# Patient Record
Sex: FEMALE | Race: WHITE | NOT HISPANIC OR LATINO
[De-identification: names, ages, dates, MRNs, and addresses within clinical notes are randomized per-mention and may not be internally consistent; named-entity substitution may affect disease eponyms.]

---

## 2017-09-30 ENCOUNTER — TRANSCRIPTION ENCOUNTER (OUTPATIENT)
Age: 22
End: 2017-09-30

## 2019-05-04 ENCOUNTER — APPOINTMENT (OUTPATIENT)
Dept: OBGYN | Facility: CLINIC | Age: 24
End: 2019-05-04
Payer: COMMERCIAL

## 2019-05-04 VITALS
SYSTOLIC BLOOD PRESSURE: 90 MMHG | HEIGHT: 66 IN | WEIGHT: 140 LBS | DIASTOLIC BLOOD PRESSURE: 70 MMHG | BODY MASS INDEX: 22.5 KG/M2

## 2019-05-04 DIAGNOSIS — Z78.9 OTHER SPECIFIED HEALTH STATUS: ICD-10-CM

## 2019-05-04 DIAGNOSIS — Z92.29 PERSONAL HISTORY OF OTHER DRUG THERAPY: ICD-10-CM

## 2019-05-04 PROCEDURE — 99395 PREV VISIT EST AGE 18-39: CPT

## 2019-05-04 NOTE — PHYSICAL EXAM
[Awake] : awake [Alert] : alert [Soft] : soft [Oriented x3] : oriented to person, place, and time [No Bleeding] : there was no active vaginal bleeding [Uterine Adnexae] : were not tender and not enlarged [Acute Distress] : no acute distress [Mass] : no breast mass [Nipple Discharge] : no nipple discharge [Axillary LAD] : no axillary lymphadenopathy [Tender] : non tender [Labia Majora] : labia major [Labia Minora] : labia minora [Normal] : clitoris [Pap Obtained] : a Pap smear was performed

## 2019-05-04 NOTE — HISTORY OF PRESENT ILLNESS
[Good] : being in good health [1 Year Ago] : 1 year ago [Healthy Diet] : a healthy diet [Last Pap ___] : Last cervical pap smear was [unfilled] [Reproductive Age] : is of reproductive age [Oral Contraceptive] : uses oral contraception pills [Contraception] : uses contraception [Male ___] : [unfilled] male [Monogamous] : is monogamous [Sexually Active] : is sexually active

## 2019-05-06 LAB
C TRACH RRNA SPEC QL NAA+PROBE: NOT DETECTED
N GONORRHOEA RRNA SPEC QL NAA+PROBE: NOT DETECTED
SOURCE TP AMPLIFICATION: NORMAL

## 2019-05-07 LAB — HPV HIGH+LOW RISK DNA PNL CVX: NOT DETECTED

## 2019-05-09 LAB — CYTOLOGY CVX/VAG DOC THIN PREP: NORMAL

## 2019-10-23 ENCOUNTER — APPOINTMENT (OUTPATIENT)
Dept: OBGYN | Facility: CLINIC | Age: 24
End: 2019-10-23
Payer: COMMERCIAL

## 2019-10-23 VITALS
DIASTOLIC BLOOD PRESSURE: 80 MMHG | BODY MASS INDEX: 24.72 KG/M2 | SYSTOLIC BLOOD PRESSURE: 110 MMHG | HEIGHT: 65 IN | WEIGHT: 148.37 LBS

## 2019-10-23 PROCEDURE — 99213 OFFICE O/P EST LOW 20 MIN: CPT

## 2019-10-23 NOTE — HISTORY OF PRESENT ILLNESS
[6 Months Ago] : 6 months ago [Good] : being in good health [Healthy Diet] : a healthy diet [Regular Exercise] : regular exercise [Last Pap ___] : Last cervical pap smear was [unfilled] [Reproductive Age] : is of reproductive age [Contraception] : uses contraception [Sexually Active] : is sexually active [Yes] : yes [Menstrual Problems] : reports normal menses

## 2020-01-20 ENCOUNTER — TRANSCRIPTION ENCOUNTER (OUTPATIENT)
Age: 25
End: 2020-01-20

## 2020-01-24 ENCOUNTER — OUTPATIENT (OUTPATIENT)
Dept: OUTPATIENT SERVICES | Facility: HOSPITAL | Age: 25
LOS: 1 days | End: 2020-01-24
Payer: COMMERCIAL

## 2020-01-24 ENCOUNTER — APPOINTMENT (OUTPATIENT)
Dept: MRI IMAGING | Facility: CLINIC | Age: 25
End: 2020-01-24
Payer: COMMERCIAL

## 2020-01-24 ENCOUNTER — APPOINTMENT (OUTPATIENT)
Dept: ORTHOPEDIC SURGERY | Facility: CLINIC | Age: 25
End: 2020-01-24
Payer: COMMERCIAL

## 2020-01-24 VITALS
SYSTOLIC BLOOD PRESSURE: 117 MMHG | WEIGHT: 140 LBS | HEART RATE: 64 BPM | HEIGHT: 66 IN | BODY MASS INDEX: 22.5 KG/M2 | DIASTOLIC BLOOD PRESSURE: 77 MMHG

## 2020-01-24 DIAGNOSIS — S89.92XA UNSPECIFIED INJURY OF LEFT LOWER LEG, INITIAL ENCOUNTER: ICD-10-CM

## 2020-01-24 PROCEDURE — 99203 OFFICE O/P NEW LOW 30 MIN: CPT

## 2020-01-24 PROCEDURE — 73721 MRI JNT OF LWR EXTRE W/O DYE: CPT

## 2020-01-24 PROCEDURE — 73721 MRI JNT OF LWR EXTRE W/O DYE: CPT | Mod: 26,LT

## 2020-01-24 NOTE — HISTORY OF PRESENT ILLNESS
[de-identified] : 24 y.o F presents to the office 1 week after injuring her L knee. She states she was skiing this past weekend of 1/18 and injured her L knee. Complains mostly of anterior-medial knee pain. She is unsure if she felt a pop/snap at the time of injury, describes an instability event, but was unable to continue skiing. She states her pain has decreased but is describing instability since the day of injury while ambulating. Denies previous history of injury. Pain increases with terminal knee flexion. Denies mechanical symptoms.\par \par The patient's past medical history, past surgical history, medications, allergies, and social history were reviewed by me today with the patient and documented accordingly. In addition, the patient's family history, which is noncontributory to this visit, was also reviewed.\par

## 2020-01-24 NOTE — PHYSICAL EXAM
[de-identified] : General Exam\par \par Well developed, well nourished\par No apparent distress\par Oriented to person, place, and time\par Mood: Normal\par Affect: Normal\par Balance and coordination: Normal\par Gait: Normal\par \par left knee exam\par \par Skin: Clean, dry, intact\par Inspection: No obvious malalignment, no masses, no swelling, + effusion.\par Tenderness: no MJLT. No LJLT. No tenderness over the medial and lateral patella facets. No ttp medial/lateral epicondyle, patella tendon, tibial tubercle, pes anserinus, or proximal fibula.\par ROM: 0 to 130° no pain with deep flexion in both knees\par Stability: Stable to varus, valgus, lachman testing. Negative anterior/posterior drawer.\par Additional tests: Negative McMurrays test, Negative patellar grind test. \par Strength: 5/5 Q/H/TA/GS/EHL, no atrophy\par Neuro: In tact to light touch throughout in dp/sp/tib/camille/saph nerve districutions, DTR's normal\par Pulses: 2+ DP/PT pulses.\par  [de-identified] : X-rays obtained outside were reviewed. There is a mild effusion. There is no fracture.\par \par

## 2020-01-24 NOTE — DISCUSSION/SUMMARY
[de-identified] : 24-year-old female left knee pain after twisting injury while skiing. She has no effusion. He is unable to bear weight initially. We'll obtain an MRI to further evaluate she will follow up after MRI. All questions answered

## 2020-02-03 ENCOUNTER — FORM ENCOUNTER (OUTPATIENT)
Age: 25
End: 2020-02-03

## 2020-02-04 ENCOUNTER — APPOINTMENT (OUTPATIENT)
Dept: ULTRASOUND IMAGING | Facility: CLINIC | Age: 25
End: 2020-02-04
Payer: COMMERCIAL

## 2020-02-04 ENCOUNTER — APPOINTMENT (OUTPATIENT)
Dept: ORTHOPEDIC SURGERY | Facility: CLINIC | Age: 25
End: 2020-02-04
Payer: COMMERCIAL

## 2020-02-04 ENCOUNTER — OUTPATIENT (OUTPATIENT)
Dept: OUTPATIENT SERVICES | Facility: HOSPITAL | Age: 25
LOS: 1 days | End: 2020-02-04
Payer: COMMERCIAL

## 2020-02-04 DIAGNOSIS — Z00.8 ENCOUNTER FOR OTHER GENERAL EXAMINATION: ICD-10-CM

## 2020-02-04 PROCEDURE — 99213 OFFICE O/P EST LOW 20 MIN: CPT

## 2020-02-04 PROCEDURE — 93970 EXTREMITY STUDY: CPT | Mod: 26

## 2020-02-04 PROCEDURE — 93970 EXTREMITY STUDY: CPT

## 2020-02-04 NOTE — DISCUSSION/SUMMARY
[de-identified] : 24-year-old female left knee anterior cruciate ligament tear. We discussed treatment options at length both surgical and nonsurgical. We discussed nonsurgical treatment with activity modification bracing therapy. We discussed risk of future injury. We discussed surgical treatment. Left knee arthroscopy anterior cruciate ligament reconstruction and possible lateral meniscus repair. We discussed the surgery postoperative protocol restrictions at length. Given her age and activity level I recommend hamstring autograft. Risks benefits alternatives of surgery discussed included but not limited to risks such as bleeding infection nerve and vessel injury continued pain stiffness need for future surgery medical complications such as DVT or PE and anesthesia complications. All questions are answered she will schedule her convenience\par \par c/o mild calf pain.  will obtain duplex to r/o dvt

## 2020-02-04 NOTE — PHYSICAL EXAM
[de-identified] : left knee exam\par \par Skin: Clean, dry, intact\par Inspection: No obvious malalignment, no masses, no swelling, + effusion.\par Tenderness: no MJLT. No LJLT. No tenderness over the medial and lateral patella facets. No ttp medial/lateral epicondyle, patella tendon, tibial tubercle, pes anserinus, or proximal fibula.\par ROM: 0 to 130° no pain with deep flexion in both knees\par Stability: Stable to varus, valgus, lachman testing. Negative anterior/posterior drawer.\par Additional tests: Negative McMurrays test, Negative patellar grind test. \par Strength: 5/5 Q/H/TA/GS/EHL, no atrophy\par Neuro: In tact to light touch throughout in dp/sp/tib/camille/saph nerve districutions, DTR's normal\par Pulses: 2+ DP/PT pulses. [de-identified] : MRI left knee reviewed. It is full-thickness tearing of the anterior cruciate ligament with bone contusions. Possible small vertical lateral meniscus tear\par \par

## 2020-02-04 NOTE — HISTORY OF PRESENT ILLNESS
[de-identified] : 24 y.o F presents to the office 1 week after injuring her L knee. She states she was skiing this past weekend of 1/18 and injured her L knee. Complains mostly of anterior-medial knee pain. She is unsure if she felt a pop/snap at the time of injury, describes an instability event, but was unable to continue skiing. She states her pain has decreased but is describing instability since the day of injury while ambulating. Denies previous history of injury. Pain increases with terminal knee flexion. Denies mechanical symptoms.\par A MRI was done since last visit, here to review results today.

## 2020-02-05 ENCOUNTER — NON-APPOINTMENT (OUTPATIENT)
Age: 25
End: 2020-02-05

## 2020-02-08 ENCOUNTER — NON-APPOINTMENT (OUTPATIENT)
Age: 25
End: 2020-02-08

## 2020-02-28 ENCOUNTER — OUTPATIENT (OUTPATIENT)
Dept: OUTPATIENT SERVICES | Facility: HOSPITAL | Age: 25
LOS: 1 days | Discharge: HOME | End: 2020-02-28

## 2020-02-28 ENCOUNTER — TRANSCRIPTION ENCOUNTER (OUTPATIENT)
Age: 25
End: 2020-02-28

## 2020-02-28 DIAGNOSIS — Z01.818 ENCOUNTER FOR OTHER PREPROCEDURAL EXAMINATION: ICD-10-CM

## 2020-02-28 DIAGNOSIS — S83.512A SPRAIN OF ANTERIOR CRUCIATE LIGAMENT OF LEFT KNEE, INITIAL ENCOUNTER: ICD-10-CM

## 2020-02-28 LAB
ANION GAP SERPL CALC-SCNC: 4 MMOL/L — LOW (ref 5–17)
BUN SERPL-MCNC: 11 MG/DL — SIGNIFICANT CHANGE UP (ref 7–23)
CALCIUM SERPL-MCNC: 9.6 MG/DL — SIGNIFICANT CHANGE UP (ref 8.5–10.1)
CHLORIDE SERPL-SCNC: 105 MMOL/L — SIGNIFICANT CHANGE UP (ref 96–108)
CO2 SERPL-SCNC: 29 MMOL/L — SIGNIFICANT CHANGE UP (ref 22–31)
CREAT SERPL-MCNC: 0.7 MG/DL — SIGNIFICANT CHANGE UP (ref 0.5–1.3)
GLUCOSE SERPL-MCNC: 74 MG/DL — SIGNIFICANT CHANGE UP (ref 70–99)
HCG UR QL: NEGATIVE — SIGNIFICANT CHANGE UP
HCT VFR BLD CALC: 40.4 % — SIGNIFICANT CHANGE UP (ref 34.5–45)
HGB BLD-MCNC: 13.8 G/DL — SIGNIFICANT CHANGE UP (ref 11.5–15.5)
MCHC RBC-ENTMCNC: 31.2 PG — SIGNIFICANT CHANGE UP (ref 27–34)
MCHC RBC-ENTMCNC: 34.2 GM/DL — SIGNIFICANT CHANGE UP (ref 32–36)
MCV RBC AUTO: 91.4 FL — SIGNIFICANT CHANGE UP (ref 80–100)
NRBC # BLD: 0 /100 WBCS — SIGNIFICANT CHANGE UP (ref 0–0)
PLATELET # BLD AUTO: 234 K/UL — SIGNIFICANT CHANGE UP (ref 150–400)
POTASSIUM SERPL-MCNC: 4 MMOL/L — SIGNIFICANT CHANGE UP (ref 3.5–5.3)
POTASSIUM SERPL-SCNC: 4 MMOL/L — SIGNIFICANT CHANGE UP (ref 3.5–5.3)
RBC # BLD: 4.42 M/UL — SIGNIFICANT CHANGE UP (ref 3.8–5.2)
RBC # FLD: 11.9 % — SIGNIFICANT CHANGE UP (ref 10.3–14.5)
SODIUM SERPL-SCNC: 138 MMOL/L — SIGNIFICANT CHANGE UP (ref 135–145)
WBC # BLD: 6.11 K/UL — SIGNIFICANT CHANGE UP (ref 3.8–10.5)
WBC # FLD AUTO: 6.11 K/UL — SIGNIFICANT CHANGE UP (ref 3.8–10.5)

## 2020-02-28 NOTE — H&P PST ADULT - NSICDXPROBLEM_GEN_ALL_CORE_FT
Improved
PROBLEM DIAGNOSES  Problem: Sprain of anterior cruciate ligament of left knee, initial encounter  Assessment and Plan: Left knee arthroscopy, ACL reconstruction on 3/12/2020

## 2020-02-28 NOTE — H&P PST ADULT - HISTORY OF PRESENT ILLNESS
24 year old female with no significant past medical history injured left knee while skining in 1/2020.  She is scheduled for a left knee arthrosocpy ACL reconstruction on on 3/12/2020. 24 year old female with no significant past medical history injured her left knee while skiing in 1/2020.  She is scheduled for a left knee arthroscopy, ACL reconstruction on 3/12/2020.

## 2020-02-28 NOTE — H&P PST ADULT - ASSESSMENT
24 year old female with no significant past medical history injured her left knee while skiing in 2020.  She is scheduled for a left knee arthroscopy, ACL reconstruction on 3/12/2020.    CAPRINI SCORE [CLOT]    AGE RELATED RISK FACTORS                                                       MOBILITY RELATED FACTORS  [ ] Age 41-60 years                                            (1 Point)                  [ ] Bed rest                                                        (1 Point)  [ ] Age: 61-74 years                                           (2 Points)                 [ ] Plaster cast                                                   (2 Points)  [ ] Age= 75 years                                              (3 Points)                 [ ] Bed bound for more than 72 hours                 (2 Points)    DISEASE RELATED RISK FACTORS                                               GENDER SPECIFIC FACTORS  [ ] Edema in the lower extremities                       (1 Point)                  [ ] Pregnancy                                                     (1 Point)  [ ] Varicose veins                                               (1 Point)                  [ ] Post-partum < 6 weeks                                   (1 Point)             [ ] BMI > 25 Kg/m2                                            (1 Point)                  [ ] Hormonal therapy  or oral contraception          (1 Point)                 [ ] Sepsis (in the previous month)                        (1 Point)                  [ ] History of pregnancy complications                 (1 point)  [ ] Pneumonia or serious lung disease                                               [ ] Unexplained or recurrent                     (1 Point)           (in the previous month)                               (1 Point)  [ ] Abnormal pulmonary function test                     (1 Point)                 SURGERY RELATED RISK FACTORS  [ ] Acute myocardial infarction                              (1 Point)                 [ ]  Section                                             (1 Point)  [ ] Congestive heart failure (in the previous month)  (1 Point)               [ ] Minor surgery                                                  (1 Point)   [ ] Inflammatory bowel disease                             (1 Point)                 [x ] Arthroscopic surgery                                        (2 Points)  [ ] Central venous access                                      (2 Points)                [ ] General surgery lasting more than 45 minutes   (2 Points)       [ ] Stroke (in the previous month)                          (5 Points)               [ ] Elective arthroplasty                                         (5 Points)                                                                                                                                               HEMATOLOGY RELATED FACTORS                                                 TRAUMA RELATED RISK FACTORS  [ ] Prior episodes of VTE                                     (3 Points)                [ ] Fracture of the hip, pelvis, or leg                       (5 Points)  [ ] Positive family history for VTE                         (3 Points)                 [ ] Acute spinal cord injury (in the previous month)  (5 Points)  [ ] Prothrombin 61249 A                                     (3 Points)                 [ ] Paralysis  (less than 1 month)                             (5 Points)  [ ] Factor V Leiden                                             (3 Points)                  [ ] Multiple Trauma within 1 month                        (5 Points)  [ ] Lupus anticoagulants                                     (3 Points)                                                           [ ] Anticardiolipin antibodies                               (3 Points)                                                       [ ] High homocysteine in the blood                      (3 Points)                                             [ ] Other congenital or acquired thrombophilia      (3 Points)                                                [ ] Heparin induced thrombocytopenia                  (3 Points)                                          Total Score [  2        ]    Caprini Score 0 - 2:  Low Risk, No VTE Prophylaxis required for most patients, encourage ambulation  Caprini Score 3 - 6:  At Risk, pharmacologic VTE prophylaxis is indicated for most patients (in the absence of a contraindication)  Caprini Score Greater than or = 7:  High Risk, pharmacologic VTE prophylaxis is indicated for most patients (in the absence of a contraindication)

## 2020-03-11 ENCOUNTER — TRANSCRIPTION ENCOUNTER (OUTPATIENT)
Age: 25
End: 2020-03-11

## 2020-03-12 ENCOUNTER — OUTPATIENT (OUTPATIENT)
Dept: OUTPATIENT SERVICES | Facility: HOSPITAL | Age: 25
LOS: 1 days | Discharge: HOME | End: 2020-03-12
Payer: MEDICARE

## 2020-03-12 ENCOUNTER — APPOINTMENT (OUTPATIENT)
Dept: ORTHOPEDIC SURGERY | Facility: HOSPITAL | Age: 25
End: 2020-03-12

## 2020-03-12 VITALS
HEART RATE: 91 BPM | RESPIRATION RATE: 16 BRPM | TEMPERATURE: 98 F | SYSTOLIC BLOOD PRESSURE: 136 MMHG | OXYGEN SATURATION: 97 % | DIASTOLIC BLOOD PRESSURE: 80 MMHG

## 2020-03-12 VITALS
WEIGHT: 139.99 LBS | DIASTOLIC BLOOD PRESSURE: 84 MMHG | HEART RATE: 58 BPM | TEMPERATURE: 98 F | SYSTOLIC BLOOD PRESSURE: 121 MMHG | OXYGEN SATURATION: 98 % | RESPIRATION RATE: 20 BRPM | HEIGHT: 66 IN

## 2020-03-12 LAB — HCG UR QL: NEGATIVE — SIGNIFICANT CHANGE UP

## 2020-03-12 PROCEDURE — 29888 ARTHRS AID ACL RPR/AGMNTJ: CPT | Mod: LT

## 2020-03-12 RX ORDER — SODIUM CHLORIDE 9 MG/ML
3 INJECTION INTRAMUSCULAR; INTRAVENOUS; SUBCUTANEOUS EVERY 8 HOURS
Refills: 0 | Status: DISCONTINUED | OUTPATIENT
Start: 2020-03-12 | End: 2020-03-12

## 2020-03-12 RX ORDER — NORGESTIMATE AND ETHINYL ESTRADIOL 7DAYSX3 LO
1 KIT ORAL
Qty: 0 | Refills: 0 | DISCHARGE

## 2020-03-12 RX ORDER — SODIUM CHLORIDE 9 MG/ML
1000 INJECTION, SOLUTION INTRAVENOUS
Refills: 0 | Status: DISCONTINUED | OUTPATIENT
Start: 2020-03-12 | End: 2020-03-12

## 2020-03-12 RX ORDER — ACETAMINOPHEN 500 MG
1000 TABLET ORAL ONCE
Refills: 0 | Status: COMPLETED | OUTPATIENT
Start: 2020-03-12 | End: 2020-03-12

## 2020-03-12 RX ORDER — HYDROMORPHONE HYDROCHLORIDE 2 MG/ML
0.25 INJECTION INTRAMUSCULAR; INTRAVENOUS; SUBCUTANEOUS
Refills: 0 | Status: DISCONTINUED | OUTPATIENT
Start: 2020-03-12 | End: 2020-03-12

## 2020-03-12 RX ORDER — ONDANSETRON 8 MG/1
4 TABLET, FILM COATED ORAL ONCE
Refills: 0 | Status: DISCONTINUED | OUTPATIENT
Start: 2020-03-12 | End: 2020-03-12

## 2020-03-12 RX ORDER — HYDROMORPHONE HYDROCHLORIDE 2 MG/ML
0.5 INJECTION INTRAMUSCULAR; INTRAVENOUS; SUBCUTANEOUS
Refills: 0 | Status: DISCONTINUED | OUTPATIENT
Start: 2020-03-12 | End: 2020-03-12

## 2020-03-12 RX ORDER — ASPIRIN/CALCIUM CARB/MAGNESIUM 324 MG
1 TABLET ORAL
Qty: 30 | Refills: 0
Start: 2020-03-12 | End: 2020-04-10

## 2020-03-12 RX ADMIN — Medication 400 MILLIGRAM(S): at 14:38

## 2020-03-12 RX ADMIN — Medication 1000 MILLIGRAM(S): at 15:00

## 2020-03-12 RX ADMIN — HYDROMORPHONE HYDROCHLORIDE 0.5 MILLIGRAM(S): 2 INJECTION INTRAMUSCULAR; INTRAVENOUS; SUBCUTANEOUS at 15:00

## 2020-03-12 RX ADMIN — HYDROMORPHONE HYDROCHLORIDE 0.5 MILLIGRAM(S): 2 INJECTION INTRAMUSCULAR; INTRAVENOUS; SUBCUTANEOUS at 14:39

## 2020-03-12 RX ADMIN — SODIUM CHLORIDE 100 MILLILITER(S): 9 INJECTION, SOLUTION INTRAVENOUS at 14:40

## 2020-03-12 NOTE — ASU PATIENT PROFILE, ADULT - AS SC BRADEN FRICTION
Spoke with pt to schedule fasting labs. Pt will get labs done on 10/7/2017. Pt voiced understanding.    (3) no apparent problem

## 2020-03-12 NOTE — ASU DISCHARGE PLAN (ADULT/PEDIATRIC) - CALL YOUR DOCTOR IF YOU HAVE ANY OF THE FOLLOWING:
Fever greater than (need to indicate Fahrenheit or Celsius)/Numbness, tingling, color or temperature change to extremity/Inability to tolerate liquids or foods/Bleeding that does not stop/Swelling that gets worse/Wound/Surgical Site with redness, or foul smelling discharge or pus Fever greater than (need to indicate Fahrenheit or Celsius)/Numbness, tingling, color or temperature change to extremity/Bleeding that does not stop/Inability to tolerate liquids or foods/Swelling that gets worse/Wound/Surgical Site with redness, or foul smelling discharge or pus/Pain not relieved by Medications

## 2020-03-12 NOTE — BRIEF OPERATIVE NOTE - NSICDXBRIEFPOSTOP_GEN_ALL_CORE_FT
DTs (delirium tremens)    EtOH dependence    Gastritis    Mixed hyperlipidemia    PUD (peptic ulcer disease)    Substance abuse
POST-OP DIAGNOSIS:  ACL tear 12-Mar-2020 13:19:25  Deric Baires

## 2020-03-12 NOTE — ASU DISCHARGE PLAN (ADULT/PEDIATRIC) - CARE PROVIDER_API CALL
Ad Elmore)  Orthopaedic Surgery  [address], [address]  [place] [address]  Phone: [phone]  Fax: [phone]  Follow Up Time: 2 weeks

## 2020-03-16 DIAGNOSIS — S83.512A SPRAIN OF ANTERIOR CRUCIATE LIGAMENT OF LEFT KNEE, INITIAL ENCOUNTER: ICD-10-CM
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
## 2020-03-24 ENCOUNTER — APPOINTMENT (OUTPATIENT)
Dept: ORTHOPEDIC SURGERY | Facility: CLINIC | Age: 25
End: 2020-03-24
Payer: COMMERCIAL

## 2020-03-24 PROBLEM — Z78.9 OTHER SPECIFIED HEALTH STATUS: Chronic | Status: ACTIVE | Noted: 2020-02-28

## 2020-03-24 PROCEDURE — 99024 POSTOP FOLLOW-UP VISIT: CPT

## 2020-03-24 NOTE — HISTORY OF PRESENT ILLNESS
[de-identified] : L knee [de-identified] : 24yo F s/p Left knee arthroscopy, anterior cruciate ligament reconstruction with hamstring autograft, 3/12/20.  She is doing well, no longer taking pain medication, pain improving, started PT, compliant with brace and crutches.  Denies numbness/tingling, fevers/chills. [de-identified] : Left knee exam\par Incisions are healing well no erythema\par Mild effusion\par Range of motion 0-°80\par no Medial joint line tenderness\par 1+ ant drawer 1 a lachman\par calf is soft and nontender\par Able to flex and extend all toes\par Sensation intact throughout\par brisk capillary refill.\par  [de-identified] : 24yo F s/p Left knee arthroscopy, anterior cruciate ligament reconstruction with hamstring autograft, 3/12/20. [de-identified] : She is doing well at this time. We reviewed postoperative protocol and restrictions. We reviewed the intraoperative photographs. Brace unlocked. Partial weightbearing with crutches. Encouraged to therapy home exercises. He will followup one month

## 2020-04-21 ENCOUNTER — APPOINTMENT (OUTPATIENT)
Dept: ORTHOPEDIC SURGERY | Facility: CLINIC | Age: 25
End: 2020-04-21
Payer: COMMERCIAL

## 2020-04-21 PROCEDURE — 99024 POSTOP FOLLOW-UP VISIT: CPT

## 2020-04-21 NOTE — DISCUSSION/SUMMARY
[de-identified] : We reviewed postoperative protocol and prescriptions. She may discontinue her brace at this time. Weightbearing as tolerated. Physical therapy to begin to focus on quadriceps strengthening as well as range of motion. She will follow up in 6 weeks. She is given a short knee sleeve today to wear while all questions answered

## 2020-04-21 NOTE — HISTORY OF PRESENT ILLNESS
[de-identified] : 25 y.o F presents to the office 6 weeks s/p ACL reconstruction w/ hamstring graft. She is doing well overall. Pain under control but reports intermittent soreness. Working at [employer] physical therapy 2-3 times per week.  she is walking in brace without crutches. denies n/t

## 2020-04-21 NOTE — PHYSICAL EXAM
[de-identified] : R knee exam\par inc well healed\par rom 1-120\par stable ant drawer and lachman\par calf soft nontender\par nvid\par

## 2020-04-26 ENCOUNTER — MESSAGE (OUTPATIENT)
Age: 25
End: 2020-04-26

## 2020-06-08 ENCOUNTER — APPOINTMENT (OUTPATIENT)
Dept: OBGYN | Facility: CLINIC | Age: 25
End: 2020-06-08

## 2020-06-10 ENCOUNTER — APPOINTMENT (OUTPATIENT)
Dept: ORTHOPEDIC SURGERY | Facility: CLINIC | Age: 25
End: 2020-06-10
Payer: COMMERCIAL

## 2020-06-10 DIAGNOSIS — S83.512A SPRAIN OF ANTERIOR CRUCIATE LIGAMENT OF LEFT KNEE, INITIAL ENCOUNTER: ICD-10-CM

## 2020-06-10 PROCEDURE — 99024 POSTOP FOLLOW-UP VISIT: CPT

## 2020-06-10 NOTE — HISTORY OF PRESENT ILLNESS
[de-identified] : L knee [de-identified] : Left knee exam\par Incisions are healing well no erythema\par no effusion\par Range of motion 0-°140\par no Medial joint line tenderness\par stable ant drawer and lachman\par calf is soft and nontender\par Able to flex and extend all toes\par Sensation intact throughout\par brisk capillary refill.\par  [de-identified] : 26yo F s/p Left knee arthroscopy, anterior cruciate ligament reconstruction with hamstring autograft, 3/12/20.  She is doing well, no longer taking pain medication, pain improving, working w PT.  Denies numbness/tingling, fevers/chills. [de-identified] : We reviewed postoperative protocol and restrictions. Continue physical therapy for some quadriceps strengthening and begin straight-line jogging. She will followup in 3 months [de-identified] : 26yo F s/p Left knee arthroscopy, anterior cruciate ligament reconstruction with hamstring autograft, 3/12/20.

## 2020-06-17 ENCOUNTER — TRANSCRIPTION ENCOUNTER (OUTPATIENT)
Age: 25
End: 2020-06-17

## 2020-07-07 ENCOUNTER — APPOINTMENT (OUTPATIENT)
Dept: OBGYN | Facility: CLINIC | Age: 25
End: 2020-07-07
Payer: COMMERCIAL

## 2020-07-07 VITALS
BODY MASS INDEX: 22.5 KG/M2 | SYSTOLIC BLOOD PRESSURE: 110 MMHG | TEMPERATURE: 98.5 F | HEIGHT: 66 IN | WEIGHT: 140 LBS | DIASTOLIC BLOOD PRESSURE: 70 MMHG

## 2020-07-07 PROCEDURE — 99395 PREV VISIT EST AGE 18-39: CPT

## 2020-07-07 RX ORDER — ASPIRIN 325 MG/1
325 TABLET, FILM COATED ORAL TWICE DAILY
Qty: 60 | Refills: 0 | Status: DISCONTINUED | COMMUNITY
Start: 2020-03-13 | End: 2020-07-07

## 2020-07-07 RX ORDER — OXYCODONE AND ACETAMINOPHEN 5; 325 MG/1; MG/1
5-325 TABLET ORAL
Qty: 30 | Refills: 0 | Status: DISCONTINUED | COMMUNITY
Start: 2020-03-12 | End: 2020-07-07

## 2020-07-07 NOTE — PHYSICAL EXAM
[Awake] : awake [Alert] : alert [Acute Distress] : no acute distress [Mass] : no breast mass [Nipple Discharge] : no nipple discharge [Axillary LAD] : no axillary lymphadenopathy [Soft] : soft [Tender] : non tender [Oriented x3] : oriented to person, place, and time [Normal] : uterus [No Bleeding] : there was no active vaginal bleeding [Pap Obtained] : a Pap smear was performed [Retroversion] : retroverted [Tenderness] : nontender [Enlarged ___ wks] : not enlarged [Mass ___ cm] : no uterine mass was palpated [Uterine Adnexae] : were not tender and not enlarged [Ovarian Mass (___ Cm)] : there were no adnexal masses [Adnexa Tenderness] : were not tender

## 2020-07-07 NOTE — CHIEF COMPLAINT
[Annual Visit] : annual visit [FreeTextEntry1] : Pt without complaints. Had repair of ACl in March. 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

## 2020-07-07 NOTE — HISTORY OF PRESENT ILLNESS
[6 Months Ago] : 6 months ago [Regular Exercise] : regular exercise [Healthy Diet] : a healthy diet [Good] : being in good health [Last Pap ___] : Last cervical pap smear was [unfilled] [Reproductive Age] : is of reproductive age [Contraception] : uses contraception [Sexually Active] : is sexually active [Menstrual Problems] : reports normal menses

## 2021-03-03 ENCOUNTER — TRANSCRIPTION ENCOUNTER (OUTPATIENT)
Age: 26
End: 2021-03-03

## 2021-03-16 ENCOUNTER — APPOINTMENT (OUTPATIENT)
Dept: OBGYN | Facility: CLINIC | Age: 26
End: 2021-03-16
Payer: COMMERCIAL

## 2021-03-16 VITALS
DIASTOLIC BLOOD PRESSURE: 70 MMHG | SYSTOLIC BLOOD PRESSURE: 104 MMHG | BODY MASS INDEX: 23.78 KG/M2 | HEIGHT: 66 IN | WEIGHT: 148 LBS | TEMPERATURE: 97 F | RESPIRATION RATE: 16 BRPM

## 2021-03-16 PROCEDURE — 99213 OFFICE O/P EST LOW 20 MIN: CPT

## 2021-03-16 PROCEDURE — 99072 ADDL SUPL MATRL&STAF TM PHE: CPT

## 2021-03-16 NOTE — HISTORY OF PRESENT ILLNESS
[No] : Patient does not have concerns regarding sex [Currently Active] : currently active [Men] : men [Vaginal] : vaginal [FreeTextEntry1] : Pt here for ocp refill on sprintec. No complaints.

## 2021-06-04 ENCOUNTER — TRANSCRIPTION ENCOUNTER (OUTPATIENT)
Age: 26
End: 2021-06-04

## 2021-06-15 ENCOUNTER — TRANSCRIPTION ENCOUNTER (OUTPATIENT)
Age: 26
End: 2021-06-15

## 2021-06-24 ENCOUNTER — TRANSCRIPTION ENCOUNTER (OUTPATIENT)
Age: 26
End: 2021-06-24

## 2021-08-16 ENCOUNTER — APPOINTMENT (OUTPATIENT)
Dept: OBGYN | Facility: CLINIC | Age: 26
End: 2021-08-16

## 2021-08-26 ENCOUNTER — APPOINTMENT (OUTPATIENT)
Dept: OBGYN | Facility: CLINIC | Age: 26
End: 2021-08-26
Payer: COMMERCIAL

## 2021-08-26 VITALS
HEIGHT: 66 IN | SYSTOLIC BLOOD PRESSURE: 110 MMHG | DIASTOLIC BLOOD PRESSURE: 80 MMHG | BODY MASS INDEX: 23.63 KG/M2 | WEIGHT: 147 LBS

## 2021-08-26 DIAGNOSIS — Z01.419 ENCOUNTER FOR GYNECOLOGICAL EXAMINATION (GENERAL) (ROUTINE) W/OUT ABNORMAL FINDINGS: ICD-10-CM

## 2021-08-26 DIAGNOSIS — Z12.4 ENCOUNTER FOR SCREENING FOR MALIGNANT NEOPLASM OF CERVIX: ICD-10-CM

## 2021-08-26 PROCEDURE — 99395 PREV VISIT EST AGE 18-39: CPT

## 2021-08-26 NOTE — DISCUSSION/SUMMARY
[FreeTextEntry1] : 1) pap/std cultures obtained\par 2) pt with no contraindications for continued OCP usage. - RX issued\par \par Return to office in one year, sooner prn

## 2021-08-26 NOTE — PHYSICAL EXAM
[Appropriately responsive] : appropriately responsive [No Acute Distress] : no acute distress [Alert] : alert [No Lymphadenopathy] : no lymphadenopathy [Oriented x3] : oriented x3 [Examination Of The Breasts] : a normal appearance [No Discharge] : no discharge [No Masses] : no breast masses were palpable [Labia Majora] : normal [Labia Minora] : normal [Normal] : normal [Uterine Adnexae] : normal

## 2021-08-26 NOTE — HISTORY OF PRESENT ILLNESS
[Currently Active] : currently active [Yes] : Yes [Men] : men [TextBox_4] : Rona is a 25 y/o G0 who presents today for an annual exam with request for an OCP refill.  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

## 2021-09-03 ENCOUNTER — NON-APPOINTMENT (OUTPATIENT)
Age: 26
End: 2021-09-03

## 2021-09-03 ENCOUNTER — TRANSCRIPTION ENCOUNTER (OUTPATIENT)
Age: 26
End: 2021-09-03

## 2021-09-07 ENCOUNTER — TRANSCRIPTION ENCOUNTER (OUTPATIENT)
Age: 26
End: 2021-09-07

## 2021-11-03 ENCOUNTER — TRANSCRIPTION ENCOUNTER (OUTPATIENT)
Age: 26
End: 2021-11-03

## 2022-02-15 ENCOUNTER — APPOINTMENT (OUTPATIENT)
Dept: OBGYN | Facility: CLINIC | Age: 27
End: 2022-02-15

## 2022-03-04 ENCOUNTER — APPOINTMENT (OUTPATIENT)
Dept: OBGYN | Facility: CLINIC | Age: 27
End: 2022-03-04
Payer: COMMERCIAL

## 2022-03-04 VITALS
BODY MASS INDEX: 22.66 KG/M2 | TEMPERATURE: 97.7 F | HEIGHT: 66 IN | DIASTOLIC BLOOD PRESSURE: 70 MMHG | WEIGHT: 141 LBS | SYSTOLIC BLOOD PRESSURE: 110 MMHG

## 2022-03-04 DIAGNOSIS — Z30.41 ENCOUNTER FOR SURVEILLANCE OF CONTRACEPTIVE PILLS: ICD-10-CM

## 2022-03-04 PROCEDURE — 99213 OFFICE O/P EST LOW 20 MIN: CPT

## 2022-03-04 NOTE — HISTORY OF PRESENT ILLNESS
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

## 2022-03-15 NOTE — ASU PREOP CHECKLIST - HAND OFF
yes Complex Repair And Bilobe Flap Text: The defect edges were debeveled with a #15 scalpel blade.  The primary defect was closed partially with a complex linear closure.  Given the location of the remaining defect, shape of the defect and the proximity to free margins a bilobe flap was deemed most appropriate for complete closure of the defect.  Using a sterile surgical marker, an appropriate advancement flap was drawn incorporating the defect and placing the expected incisions within the relaxed skin tension lines where possible.    The area thus outlined was incised deep to adipose tissue with a #15 scalpel blade.  The skin margins were undermined to an appropriate distance in all directions utilizing iris scissors.

## 2022-04-25 NOTE — BRIEF OPERATIVE NOTE - SPECIMENS
eICUÂ® Admission Review Note    Reason for ICU Admission: TIA    Unplanned Post-Op ICU Admission: No     Code Status: Full Resuscitation      Data Reviewed:   Recent Notes:yes   Laboratory Results:yes   Radiology Images/Reports:Yes                AV Assessment:Yes    EBBP Review:       SUP:   not indicated   VTEP: Drug Therapy     Blood Glucose Monitoring:    DM:Not previously diagnosed     Blood Glucose:  Glucose (mg/dL)   Date Value   04/25/2022 88      Treatment Ordered:N/A     Ventilator Review:   Intubated: no     Communication with: none    Active Problems: 78 yo F presents from home to ED w slurred speech. HCT negative for acute change. CTA new focal hypodensity in the left thalamus without definite associated mass effect, suspicious for an age-indeterminate infarction. Pt symptoms resolved. Pt on neuro checks, MRI brain pending. VSS awake, NAD.     Shannan Burrows MD  Pulmonary & Critical Care, [facility]   EICU
none

## 2022-04-27 ENCOUNTER — TRANSCRIPTION ENCOUNTER (OUTPATIENT)
Age: 27
End: 2022-04-27

## 2022-05-04 ENCOUNTER — TRANSCRIPTION ENCOUNTER (OUTPATIENT)
Age: 27
End: 2022-05-04

## 2022-06-16 ENCOUNTER — TRANSCRIPTION ENCOUNTER (OUTPATIENT)
Age: 27
End: 2022-06-16

## 2022-09-09 ENCOUNTER — APPOINTMENT (OUTPATIENT)
Dept: OBGYN | Facility: CLINIC | Age: 27
End: 2022-09-09

## 2022-09-09 VITALS
BODY MASS INDEX: 25.02 KG/M2 | DIASTOLIC BLOOD PRESSURE: 68 MMHG | WEIGHT: 155 LBS | SYSTOLIC BLOOD PRESSURE: 112 MMHG | TEMPERATURE: 98.8 F

## 2022-09-09 PROCEDURE — 99395 PREV VISIT EST AGE 18-39: CPT

## 2022-09-09 NOTE — HISTORY OF PRESENT ILLNESS
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 Happy with sprintec, no complaints. States has pain with intercourse occasionally, not on insertion, not in pelvis, more in vagina. [PapSmeardate] : 8/2021

## 2022-09-09 NOTE — PHYSICAL EXAM
[Appropriately responsive] : appropriately responsive [Alert] : alert [No Acute Distress] : no acute distress [Soft] : soft [Non-tender] : non-tender [Non-distended] : non-distended [No HSM] : No HSM [No Lesions] : no lesions [No Mass] : no mass [Oriented x3] : oriented x3 [Examination Of The Breasts] : a normal appearance [No Masses] : no breast masses were palpable [Labia Majora] : normal [Labia Minora] : normal [Normal] : normal [Tenderness] : nontender [Enlarged ___ wks] : not enlarged [Mass ___ cm] : no uterine mass was palpated [Uterine Adnexae] : non-palpable [FreeTextEntry4] : reproducible tenderness on palpation of levator ani muscle [FreeTextEntry5] : pap done

## 2022-09-13 LAB — CYTOLOGY CVX/VAG DOC THIN PREP: NORMAL

## 2022-10-26 ENCOUNTER — RX RENEWAL (OUTPATIENT)
Age: 27
End: 2022-10-26

## 2023-08-07 ENCOUNTER — NON-APPOINTMENT (OUTPATIENT)
Age: 28
End: 2023-08-07

## 2023-09-14 ENCOUNTER — RX RENEWAL (OUTPATIENT)
Age: 28
End: 2023-09-14

## 2023-10-05 ENCOUNTER — RX RENEWAL (OUTPATIENT)
Age: 28
End: 2023-10-05

## 2023-11-16 ENCOUNTER — APPOINTMENT (OUTPATIENT)
Dept: OBGYN | Facility: CLINIC | Age: 28
End: 2023-11-16
Payer: COMMERCIAL

## 2023-11-16 VITALS — WEIGHT: 153 LBS | DIASTOLIC BLOOD PRESSURE: 70 MMHG | SYSTOLIC BLOOD PRESSURE: 120 MMHG | BODY MASS INDEX: 24.7 KG/M2

## 2023-11-16 DIAGNOSIS — Z01.419 ENCOUNTER FOR GYNECOLOGICAL EXAMINATION (GENERAL) (ROUTINE) W/OUT ABNORMAL FINDINGS: ICD-10-CM

## 2023-11-16 PROCEDURE — 99395 PREV VISIT EST AGE 18-39: CPT

## 2023-11-16 RX ORDER — NORGESTIMATE AND ETHINYL ESTRADIOL 0.25-0.035
0.25-35 KIT ORAL DAILY
Qty: 28 | Refills: 0 | Status: DISCONTINUED | COMMUNITY
Start: 2019-05-04 | End: 2023-11-16

## 2023-11-20 LAB — CYTOLOGY CVX/VAG DOC THIN PREP: NORMAL

## 2023-12-14 ENCOUNTER — TRANSCRIPTION ENCOUNTER (OUTPATIENT)
Age: 28
End: 2023-12-14

## 2024-01-09 ENCOUNTER — APPOINTMENT (OUTPATIENT)
Dept: OBGYN | Facility: CLINIC | Age: 29
End: 2024-01-09
Payer: COMMERCIAL

## 2024-01-09 VITALS
WEIGHT: 154 LBS | DIASTOLIC BLOOD PRESSURE: 76 MMHG | HEIGHT: 66 IN | BODY MASS INDEX: 24.75 KG/M2 | SYSTOLIC BLOOD PRESSURE: 120 MMHG

## 2024-01-09 LAB
HCG UR QL: POSITIVE
QUALITY CONTROL: YES

## 2024-01-09 PROCEDURE — 76830 TRANSVAGINAL US NON-OB: CPT

## 2024-01-09 PROCEDURE — 99203 OFFICE O/P NEW LOW 30 MIN: CPT | Mod: 25

## 2024-01-09 NOTE — PROCEDURE
[Intrauterine Pregnancy] : intrauterine pregnancy [Yolk Sac] : yolk sac present [Fetal Heart] : fetal heart present [CRL: ___ (mm)] : CRL - [unfilled]Umm [Date: ___] : EDC: [unfilled] [Current GA by Sonogram: ___ (wks)] : Current GA by Sonogram: [unfilled]Uwks [___ day(s)] : [unfilled] days [WNL] : Transvaginal OB Sonogram WNL [FreeTextEntry1] : SIZE LESS THAN DATES. EDC ASSIGNED

## 2024-01-09 NOTE — PHYSICAL EXAM
[Chaperone Present] : A chaperone was present in the examining room during all aspects of the physical examination [FreeTextEntry1] :  [Normal] : uterus [No Bleeding] : there was no active vaginal bleeding [Uterine Adnexae] : were not tender and not enlarged

## 2024-01-09 NOTE — CHIEF COMPLAINT
[Urgent Visit] : Urgent Visit [FreeTextEntry1] : PT PRESENTS FOR CONFIRMATION OF PREGNANCY. NOTES LONG CYCLES.

## 2024-01-25 ENCOUNTER — APPOINTMENT (OUTPATIENT)
Dept: OBGYN | Facility: CLINIC | Age: 29
End: 2024-01-25
Payer: COMMERCIAL

## 2024-01-25 VITALS
DIASTOLIC BLOOD PRESSURE: 70 MMHG | SYSTOLIC BLOOD PRESSURE: 118 MMHG | WEIGHT: 154 LBS | BODY MASS INDEX: 24.75 KG/M2 | HEIGHT: 66 IN

## 2024-01-25 PROCEDURE — 76830 TRANSVAGINAL US NON-OB: CPT

## 2024-01-25 PROCEDURE — 99214 OFFICE O/P EST MOD 30 MIN: CPT | Mod: 25

## 2024-01-29 ENCOUNTER — TRANSCRIPTION ENCOUNTER (OUTPATIENT)
Age: 29
End: 2024-01-29

## 2024-01-29 LAB
ABO + RH PNL BLD: NORMAL
HCG SERPL-MCNC: ABNORMAL MIU/ML

## 2024-01-29 RX ORDER — METHYLERGONOVINE MALEATE 0.2 MG/1
0.2 TABLET ORAL 3 TIMES DAILY
Qty: 6 | Refills: 1 | Status: ACTIVE | COMMUNITY
Start: 2024-01-25 | End: 1900-01-01

## 2024-02-01 ENCOUNTER — APPOINTMENT (OUTPATIENT)
Dept: OBGYN | Facility: CLINIC | Age: 29
End: 2024-02-01

## 2024-02-02 ENCOUNTER — TRANSCRIPTION ENCOUNTER (OUTPATIENT)
Age: 29
End: 2024-02-02

## 2024-02-04 ENCOUNTER — LABORATORY RESULT (OUTPATIENT)
Age: 29
End: 2024-02-04

## 2024-02-06 ENCOUNTER — APPOINTMENT (OUTPATIENT)
Dept: OBGYN | Facility: CLINIC | Age: 29
End: 2024-02-06
Payer: COMMERCIAL

## 2024-02-06 VITALS
BODY MASS INDEX: 24.75 KG/M2 | SYSTOLIC BLOOD PRESSURE: 104 MMHG | HEIGHT: 66 IN | WEIGHT: 154 LBS | DIASTOLIC BLOOD PRESSURE: 62 MMHG

## 2024-02-06 PROCEDURE — 93976 VASCULAR STUDY: CPT

## 2024-02-06 PROCEDURE — 76830 TRANSVAGINAL US NON-OB: CPT

## 2024-02-06 PROCEDURE — 99203 OFFICE O/P NEW LOW 30 MIN: CPT | Mod: 25

## 2024-02-06 PROCEDURE — 81025 URINE PREGNANCY TEST: CPT

## 2024-02-07 LAB
HCG SERPL-MCNC: 158 MIU/ML
HCG UR QL: NEGATIVE
QUALITY CONTROL: YES

## 2024-02-25 NOTE — PHYSICAL EXAM
[Chaperone Declined] : Patient declined chaperone [Uterine Adnexae] : were not tender and not enlarged [No Bleeding] : there was no active vaginal bleeding [Normal] : uterus

## 2024-02-25 NOTE — CHIEF COMPLAINT
[FreeTextEntry1] : PT PRESENTS FOR URGENT VISIT DUE TO CONCERN FOR A POSSIBLE ECTOPIC PREGNANCY.  [Urgent Visit] : Urgent Visit

## 2024-02-25 NOTE — PROCEDURE
[R/O Ectopic Pregnancy] : rule out ectopic pregnancy [Color Doppler Imaging] : color doppler imaging [Transvaginal Ultrasound] : transvaginal ultrasound [Anteverted] : anteverted [L: ___ cm] : L: [unfilled] cm [No Fibroid(s)] : no fibroid(s) [H: ___ cm] : H: [unfilled] cm [W: ___cm] : W: [unfilled] cm [FreeTextEntry7] : 2.46 X 2.27 [FreeTextEntry3] : NO FREE FLUID NOTED. ENDOMETRIAL ECHO IS NORMAL.  NORMAL COLOR FLOW TO THE OVARIES [FreeTextEntry4] : UNREMARKABLE GYN SONOGRAM. NO EVIDIENCE OF EXTRAUTERINE PREGNANCY. FOLLOW UP AS CLINICALLY INDICATED.  [FreeTextEntry8] : 3.42 X 1.36

## 2024-02-27 ENCOUNTER — APPOINTMENT (OUTPATIENT)
Dept: OBGYN | Facility: CLINIC | Age: 29
End: 2024-02-27
Payer: COMMERCIAL

## 2024-02-27 VITALS
HEIGHT: 66 IN | SYSTOLIC BLOOD PRESSURE: 110 MMHG | DIASTOLIC BLOOD PRESSURE: 60 MMHG | BODY MASS INDEX: 24.75 KG/M2 | WEIGHT: 154 LBS

## 2024-02-27 DIAGNOSIS — O03.9 COMPLETE OR UNSPECIFIED SPONTANEOUS ABORTION W/OUT COMPLICATION: ICD-10-CM

## 2024-02-27 PROCEDURE — 99213 OFFICE O/P EST LOW 20 MIN: CPT | Mod: 25

## 2024-02-27 PROCEDURE — 76830 TRANSVAGINAL US NON-OB: CPT

## 2024-02-28 ENCOUNTER — TRANSCRIPTION ENCOUNTER (OUTPATIENT)
Age: 29
End: 2024-02-28

## 2024-02-28 PROBLEM — O03.9 SPONTANEOUS ABORTION: Status: ACTIVE | Noted: 2024-01-25

## 2024-02-28 LAB
ESTRADIOL SERPL-MCNC: 67 PG/ML
FSH SERPL-MCNC: 7.5 IU/L
HCG SERPL-MCNC: 4 MIU/ML
LH SERPL-ACNC: 13.9 IU/L
PROGEST SERPL-MCNC: 0.1 NG/ML

## 2024-02-28 NOTE — PROCEDURE
[R/O Ectopic Pregnancy] : rule out ectopic pregnancy [Transvaginal Ultrasound] : transvaginal ultrasound [No Fibroid(s)] : no fibroid(s) [Anteverted] : anteverted [L: ___ cm] : L: [unfilled] cm [W: ___cm] : W: [unfilled] cm [H: ___ cm] : H: [unfilled] cm [FreeTextEntry7] : 2.79 X 1.86 [FreeTextEntry3] : NO FREE FLUID NOTED. ENDOMETRIAL LINING UNREMARKABLE [FreeTextEntry4] : UNREMARKABLE GYN SONOGRAM. MULTIPLE FOLLICLES NOTED ON BOTH OVARIES. NO FREE FLUID NOTED. FOLLOW UP AS CLINICALLY INDICATED [FreeTextEntry8] : 3.09 X 2.25

## 2024-02-28 NOTE — PHYSICAL EXAM
[Chaperone Present] : A chaperone was present in the examining room during all aspects of the physical examination [FreeTextEntry1] :  [Appropriately responsive] : appropriately responsive [No Acute Distress] : no acute distress [Alert] : alert [Non-distended] : non-distended [Non-tender] : non-tender [Soft] : soft [No Lesions] : no lesions [No HSM] : No HSM [Oriented x3] : oriented x3 [No Mass] : no mass [Labia Majora] : normal [Labia Minora] : normal [Normal] : normal [Uterine Adnexae] : normal

## 2024-02-28 NOTE — PLAN
[FreeTextEntry1] : PT DOING WELL SONO NEGATIVE. DISCUSSED FUTURE PREGNANCY PLANS. INFORMATION GIVEN. FOLLOW UP IN 3 MONTHS OR PRN SOONER.

## 2024-02-28 NOTE — HISTORY OF PRESENT ILLNESS
Case management  Reviewed signed copy of IMM and answered all questions.    Dc date /disposition: 1/30/24 Home with home health.   [FreeTextEntry1] : PT PRESENTS FOR FOLLOW UP VISIT AFTER PREGNANCY LOSS. PT HASNT HAD HER MENSES YET. DISCUSSED PREGNANCY LOSS AND FUTURE PREGNANCY PLANNING. PT DENIES PAIN

## 2024-03-07 ENCOUNTER — TRANSCRIPTION ENCOUNTER (OUTPATIENT)
Age: 29
End: 2024-03-07

## 2024-05-09 ENCOUNTER — APPOINTMENT (OUTPATIENT)
Dept: OBGYN | Facility: CLINIC | Age: 29
End: 2024-05-09
Payer: COMMERCIAL

## 2024-05-09 VITALS
BODY MASS INDEX: 24.91 KG/M2 | SYSTOLIC BLOOD PRESSURE: 112 MMHG | WEIGHT: 155 LBS | HEIGHT: 66 IN | DIASTOLIC BLOOD PRESSURE: 70 MMHG

## 2024-05-09 LAB
HCG UR QL: POSITIVE
QUALITY CONTROL: YES

## 2024-05-09 PROCEDURE — 76830 TRANSVAGINAL US NON-OB: CPT

## 2024-05-09 PROCEDURE — 99213 OFFICE O/P EST LOW 20 MIN: CPT

## 2024-05-09 PROCEDURE — 81025 URINE PREGNANCY TEST: CPT

## 2024-05-16 ENCOUNTER — APPOINTMENT (OUTPATIENT)
Dept: OBGYN | Facility: CLINIC | Age: 29
End: 2024-05-16
Payer: COMMERCIAL

## 2024-05-16 VITALS
BODY MASS INDEX: 24.91 KG/M2 | DIASTOLIC BLOOD PRESSURE: 60 MMHG | SYSTOLIC BLOOD PRESSURE: 114 MMHG | HEIGHT: 66 IN | WEIGHT: 155 LBS

## 2024-05-16 DIAGNOSIS — N91.1 SECONDARY AMENORRHEA: ICD-10-CM

## 2024-05-16 PROCEDURE — 76830 TRANSVAGINAL US NON-OB: CPT

## 2024-05-16 PROCEDURE — 99214 OFFICE O/P EST MOD 30 MIN: CPT | Mod: 25

## 2024-05-27 PROBLEM — N91.1 AMENORRHEA, SECONDARY: Status: ACTIVE | Noted: 2024-01-09

## 2024-05-27 NOTE — HISTORY OF PRESENT ILLNESS
[FreeTextEntry1] : PT PRESENTS FOR CONFIRMATION OF PREGNANCY. NOTED TO HAVE A EARLY TWIN GESTATION AT PREVIOUS VISIT.

## 2024-05-27 NOTE — CHIEF COMPLAINT
[Urgent Visit] : Urgent Visit [FreeTextEntry1] : PT PRESENTS FOR CONFIRMATION OF PREGNANCY. DISCUSSED RECENT PREGNANCY LOSS

## 2024-05-27 NOTE — PROCEDURE
[Intrauterine Pregnancy] : intrauterine pregnancy [Yolk Sac] : yolk sac present [Fetal Heart] : no fetal heart [WNL] : Transvaginal OB Sonogram WNL [FreeTextEntry1] : TWIN GESTATION NOTED. FETAL POLE TIMES 2 NOTED. NO FH ACTIVITY NOTED. FOLLOW UP ONE WEEK.

## 2024-05-27 NOTE — PHYSICAL EXAM
[FreeTextEntry3] :  [Normal] : uterus [No Bleeding] : there was no active vaginal bleeding [Uterine Adnexae] : were not tender and not enlarged

## 2024-05-30 ENCOUNTER — APPOINTMENT (OUTPATIENT)
Dept: OBGYN | Facility: CLINIC | Age: 29
End: 2024-05-30
Payer: COMMERCIAL

## 2024-05-30 VITALS
WEIGHT: 156 LBS | HEART RATE: 74 BPM | SYSTOLIC BLOOD PRESSURE: 120 MMHG | HEIGHT: 66 IN | DIASTOLIC BLOOD PRESSURE: 60 MMHG | RESPIRATION RATE: 14 BRPM | BODY MASS INDEX: 25.07 KG/M2

## 2024-05-30 DIAGNOSIS — Z32.00 ENCOUNTER FOR PREGNANCY TEST, RESULT UNKNOWN: ICD-10-CM

## 2024-05-30 DIAGNOSIS — Z34.91 ENCOUNTER FOR SUPERVISION OF NORMAL PREGNANCY, UNSPECIFIED, FIRST TRIMESTER: ICD-10-CM

## 2024-05-30 PROCEDURE — 76830 TRANSVAGINAL US NON-OB: CPT

## 2024-05-30 PROCEDURE — 36415 COLL VENOUS BLD VENIPUNCTURE: CPT

## 2024-05-30 PROCEDURE — 0501F PRENATAL FLOW SHEET: CPT

## 2024-05-30 RX ORDER — PNV NO.95/FERROUS FUM/FOLIC AC 28MG-0.8MG
TABLET ORAL
Refills: 0 | Status: ACTIVE | COMMUNITY

## 2024-06-05 ENCOUNTER — NON-APPOINTMENT (OUTPATIENT)
Age: 29
End: 2024-06-05

## 2024-06-05 ENCOUNTER — APPOINTMENT (OUTPATIENT)
Dept: ULTRASOUND IMAGING | Facility: CLINIC | Age: 29
End: 2024-06-05
Payer: COMMERCIAL

## 2024-06-05 DIAGNOSIS — O20.9 HEMORRHAGE IN EARLY PREGNANCY, UNSPECIFIED: ICD-10-CM

## 2024-06-05 LAB
ABO + RH PNL BLD: NORMAL
B19V IGG SER QL IA: 0.21 INDEX
B19V IGG+IGM SER-IMP: NEGATIVE
B19V IGG+IGM SER-IMP: NORMAL
B19V IGM FLD-ACNC: 0.12 INDEX
B19V IGM SER-ACNC: NEGATIVE
BACTERIA UR CULT: NORMAL
BASOPHILS # BLD AUTO: 0.05 K/UL
BASOPHILS NFR BLD AUTO: 0.5 %
BLD GP AB SCN SERPL QL: NORMAL
CMV IGG SERPL QL: <0.2 U/ML
CMV IGG SERPL-IMP: NEGATIVE
CMV IGM SERPL QL: <8 AU/ML
CMV IGM SERPL QL: NEGATIVE
EOSINOPHIL # BLD AUTO: 0.08 K/UL
EOSINOPHIL NFR BLD AUTO: 0.8 %
ESTIMATED AVERAGE GLUCOSE: 88 MG/DL
HBA1C MFR BLD HPLC: 4.7 %
HBV SURFACE AG SER QL: NONREACTIVE
HCT VFR BLD CALC: 39.7 %
HCV AB SER QL: NONREACTIVE
HCV S/CO RATIO: 0.34 S/CO
HGB A MFR BLD: 96.6 %
HGB A2 MFR BLD: 2.4 %
HGB BLD-MCNC: 13.5 G/DL
HGB F MFR BLD: 1 %
HGB FRACT BLD-IMP: NORMAL
HIV1+2 AB SPEC QL IA.RAPID: NONREACTIVE
HSV 1 IGG TYPE-SPECIFIC AB: 7.8 INDEX
HSV 2 IGG TYPE-SPECIFIC AB: <0.9 INDEX
IMM GRANULOCYTES NFR BLD AUTO: 0.3 %
LYMPHOCYTES # BLD AUTO: 2.02 K/UL
LYMPHOCYTES NFR BLD AUTO: 21.2 %
MAN DIFF?: NORMAL
MCHC RBC-ENTMCNC: 31.6 PG
MCHC RBC-ENTMCNC: 34 GM/DL
MCV RBC AUTO: 93 FL
MEV IGG FLD QL IA: 105 AU/ML
MEV IGG+IGM SER-IMP: POSITIVE
MONOCYTES # BLD AUTO: 0.82 K/UL
MONOCYTES NFR BLD AUTO: 8.6 %
NEUTROPHILS # BLD AUTO: 6.53 K/UL
NEUTROPHILS NFR BLD AUTO: 68.6 %
PLATELET # BLD AUTO: 225 K/UL
RBC # BLD: 4.27 M/UL
RBC # FLD: 12.7 %
RUBV IGG FLD-ACNC: 6.7 INDEX
RUBV IGG SER-IMP: POSITIVE
T GONDII AB SER-IMP: NEGATIVE
T GONDII AB SER-IMP: NEGATIVE
T GONDII IGG SER QL: <3 IU/ML
T GONDII IGM SER QL: <3 AU/ML
T PALLIDUM AB SER QL IA: NEGATIVE
TSH SERPL-ACNC: 1.06 UIU/ML
VZV AB TITR SER: POSITIVE
VZV IGG SER IF-ACNC: 1404 INDEX
WBC # FLD AUTO: 9.53 K/UL

## 2024-06-05 PROCEDURE — 76817 TRANSVAGINAL US OBSTETRIC: CPT

## 2024-06-06 ENCOUNTER — TRANSCRIPTION ENCOUNTER (OUTPATIENT)
Age: 29
End: 2024-06-06

## 2024-06-06 LAB
HCG SERPL-MCNC: ABNORMAL MIU/ML
PROGEST SERPL-MCNC: 34.8 NG/ML

## 2024-06-07 ENCOUNTER — APPOINTMENT (OUTPATIENT)
Dept: FAMILY MEDICINE | Facility: CLINIC | Age: 29
End: 2024-06-07
Payer: COMMERCIAL

## 2024-06-07 ENCOUNTER — TRANSCRIPTION ENCOUNTER (OUTPATIENT)
Age: 29
End: 2024-06-07

## 2024-06-07 VITALS
SYSTOLIC BLOOD PRESSURE: 110 MMHG | BODY MASS INDEX: 24.11 KG/M2 | HEART RATE: 89 BPM | HEIGHT: 66 IN | RESPIRATION RATE: 14 BRPM | DIASTOLIC BLOOD PRESSURE: 80 MMHG | WEIGHT: 150 LBS | OXYGEN SATURATION: 99 %

## 2024-06-07 DIAGNOSIS — Z83.438 FAMILY HISTORY OF OTHER DISORDER OF LIPOPROTEIN METABOLISM AND OTHER LIPIDEMIA: ICD-10-CM

## 2024-06-07 DIAGNOSIS — Z00.00 ENCOUNTER FOR GENERAL ADULT MEDICAL EXAMINATION W/OUT ABNORMAL FINDINGS: ICD-10-CM

## 2024-06-07 DIAGNOSIS — Z13.228 ENCOUNTER FOR SCREENING FOR OTHER SUSPECTED ENDOCRINE DISORDER: ICD-10-CM

## 2024-06-07 DIAGNOSIS — Z13.0 ENCOUNTER FOR SCREENING FOR OTHER SUSPECTED ENDOCRINE DISORDER: ICD-10-CM

## 2024-06-07 DIAGNOSIS — Z82.49 FAMILY HISTORY OF ISCHEMIC HEART DISEASE AND OTHER DISEASES OF THE CIRCULATORY SYSTEM: ICD-10-CM

## 2024-06-07 DIAGNOSIS — Z13.220 ENCOUNTER FOR SCREENING FOR LIPOID DISORDERS: ICD-10-CM

## 2024-06-07 DIAGNOSIS — Z13.29 ENCOUNTER FOR SCREENING FOR OTHER SUSPECTED ENDOCRINE DISORDER: ICD-10-CM

## 2024-06-07 DIAGNOSIS — Z80.0 FAMILY HISTORY OF MALIGNANT NEOPLASM OF DIGESTIVE ORGANS: ICD-10-CM

## 2024-06-07 PROCEDURE — 36415 COLL VENOUS BLD VENIPUNCTURE: CPT

## 2024-06-07 PROCEDURE — 99385 PREV VISIT NEW AGE 18-39: CPT

## 2024-06-07 NOTE — HISTORY OF PRESENT ILLNESS
[de-identified] : New patient to establish care. Pt in office for CPE. Denies CP, palpitations, dyspnea, n/v. Pregnant 9 weeks w/ twins. Pt sees Dr. Luis OB Nonsmoker ETOH use social but stopped currently due to pregnancy Drug use denies Exercises regularly 2-3x a week orange theory Diet balanced poor high carb/portions Works as cardio NP in [employer]

## 2024-06-07 NOTE — PHYSICAL EXAM
[TextEntry] : Constitutional:  no acute distress, well nourished, well developed and well-appearing Eyes:  normal sclera/conjunctiva, pupils equal round and reactive to light and extraocular movements intact. ENT:  the outer ears and nose were normal in appearance, TMs were wnl and the oropharynx was normal. Neck:  supple, no lymphadenopathy Pulmonary:  lungs were clear to auscultation bilaterally, no respiratory distress, no accessory muscle use.  Cardiac:  normal rate, with a regular rhythm, normal S1 and S2 and no murmur heard.  Vascular:  there was no peripheral edema Abdomen:  abdomen soft, non-tender, non-distended, no HSM. Lymphatic:  no posterior cervical lymphadenopathy, no anterior cervical lymphadenopathy, no supraclavicular lymphadenopathy Back:  no CVA tenderness and no spinal tenderness.  Musculoskeletal:  no joint swelling and grossly normal strength/tone. Skin:  no rash.  Neurology:  no focal deficits. Psychiatric:  the affect was normal and insight and judgment were intact.

## 2024-06-07 NOTE — ASSESSMENT
[FreeTextEntry1] : Pregnant in first trimester - f/u with obgyn Healthy diet and regular exercise regimen discussed w/ pt. Screening labs ordered flu vaccine during flu season recommended Advised routine pap smear Any questions call office

## 2024-06-07 NOTE — HEALTH RISK ASSESSMENT
[0] : 2) Feeling down, depressed, or hopeless: Not at all (0) [PHQ-2 Negative - No further assessment needed] : PHQ-2 Negative - No further assessment needed [Never] : Never [PBG7Cxlqt] : 0 [Patient reported PAP Smear was normal] : Patient reported PAP Smear was normal [PapSmearDate] : 11/23

## 2024-06-10 LAB
CFTR MUT TESTED BLD/T: NEGATIVE
FMR1 GENE MUT ANL BLD/T: NORMAL

## 2024-06-13 LAB — AR GENE MUT ANL BLD/T: ABNORMAL

## 2024-06-18 ENCOUNTER — NON-APPOINTMENT (OUTPATIENT)
Age: 29
End: 2024-06-18

## 2024-06-18 LAB
ALBUMIN SERPL ELPH-MCNC: 4.3 G/DL
ALP BLD-CCNC: 38 U/L
ALT SERPL-CCNC: 12 U/L
ANION GAP SERPL CALC-SCNC: 12 MMOL/L
AST SERPL-CCNC: 17 U/L
BILIRUB SERPL-MCNC: 0.5 MG/DL
BUN SERPL-MCNC: 10 MG/DL
CALCIUM SERPL-MCNC: 9.7 MG/DL
CHLORIDE SERPL-SCNC: 103 MMOL/L
CHOLEST SERPL-MCNC: 216 MG/DL
CO2 SERPL-SCNC: 22 MMOL/L
CREAT SERPL-MCNC: 0.69 MG/DL
EGFR: 120 ML/MIN/1.73M2
FOLATE SERPL-MCNC: 19.5 NG/ML
GLUCOSE SERPL-MCNC: 79 MG/DL
HDLC SERPL-MCNC: 76 MG/DL
LDLC SERPL CALC-MCNC: 127 MG/DL
NONHDLC SERPL-MCNC: 141 MG/DL
POTASSIUM SERPL-SCNC: 4.1 MMOL/L
PROT SERPL-MCNC: 6.8 G/DL
SODIUM SERPL-SCNC: 136 MMOL/L
T4 FREE SERPL-MCNC: 1.3 NG/DL
TRIGL SERPL-MCNC: 80 MG/DL
TSH SERPL-ACNC: 0.42 UIU/ML
VIT B12 SERPL-MCNC: 701 PG/ML

## 2024-06-19 ENCOUNTER — NON-APPOINTMENT (OUTPATIENT)
Age: 29
End: 2024-06-19

## 2024-06-20 ENCOUNTER — NON-APPOINTMENT (OUTPATIENT)
Age: 29
End: 2024-06-20

## 2024-06-20 ENCOUNTER — APPOINTMENT (OUTPATIENT)
Dept: OBGYN | Facility: CLINIC | Age: 29
End: 2024-06-20
Payer: COMMERCIAL

## 2024-06-20 VITALS
BODY MASS INDEX: 24.91 KG/M2 | DIASTOLIC BLOOD PRESSURE: 60 MMHG | WEIGHT: 155 LBS | SYSTOLIC BLOOD PRESSURE: 108 MMHG | HEIGHT: 66 IN

## 2024-06-20 DIAGNOSIS — O30.001 TWIN PREGNANCY, UNSPECIFIED NUMBER OF PLACENTA AND UNSPECIFIED NUMBER OF AMNIOTIC SACS, FIRST TRIMESTER: ICD-10-CM

## 2024-06-20 PROCEDURE — 0502F SUBSEQUENT PRENATAL CARE: CPT

## 2024-06-27 ENCOUNTER — APPOINTMENT (OUTPATIENT)
Dept: ANTEPARTUM | Facility: CLINIC | Age: 29
End: 2024-06-27

## 2024-06-27 DIAGNOSIS — Z34.01 ENCOUNTER FOR SUPERVISION OF NORMAL FIRST PREGNANCY, FIRST TRIMESTER: ICD-10-CM

## 2024-07-02 ENCOUNTER — APPOINTMENT (OUTPATIENT)
Dept: ANTEPARTUM | Facility: CLINIC | Age: 29
End: 2024-07-02
Payer: COMMERCIAL

## 2024-07-02 ENCOUNTER — ASOB RESULT (OUTPATIENT)
Age: 29
End: 2024-07-02

## 2024-07-02 PROCEDURE — 76814 OB US NUCHAL MEAS ADD-ON: CPT

## 2024-07-02 PROCEDURE — 76813 OB US NUCHAL MEAS 1 GEST: CPT

## 2024-07-03 ENCOUNTER — APPOINTMENT (OUTPATIENT)
Dept: OBGYN | Facility: CLINIC | Age: 29
End: 2024-07-03

## 2024-07-03 ENCOUNTER — APPOINTMENT (OUTPATIENT)
Dept: ANTEPARTUM | Facility: CLINIC | Age: 29
End: 2024-07-03

## 2024-07-03 ENCOUNTER — TRANSCRIPTION ENCOUNTER (OUTPATIENT)
Age: 29
End: 2024-07-03

## 2024-07-05 ENCOUNTER — TRANSCRIPTION ENCOUNTER (OUTPATIENT)
Age: 29
End: 2024-07-05

## 2024-07-10 ENCOUNTER — NON-APPOINTMENT (OUTPATIENT)
Age: 29
End: 2024-07-10

## 2024-07-11 ENCOUNTER — APPOINTMENT (OUTPATIENT)
Dept: OBGYN | Facility: CLINIC | Age: 29
End: 2024-07-11
Payer: COMMERCIAL

## 2024-07-11 VITALS
BODY MASS INDEX: 25.23 KG/M2 | DIASTOLIC BLOOD PRESSURE: 68 MMHG | WEIGHT: 157 LBS | HEIGHT: 66 IN | SYSTOLIC BLOOD PRESSURE: 112 MMHG

## 2024-07-11 DIAGNOSIS — Z3A.14 14 WEEKS GESTATION OF PREGNANCY: ICD-10-CM

## 2024-07-11 LAB
BILIRUB UR QL STRIP: NORMAL
CLARITY UR: CLEAR
COLLECTION METHOD: NORMAL
GLUCOSE UR-MCNC: NORMAL
HCG UR QL: 0.2 EU/DL
HGB UR QL STRIP.AUTO: NORMAL
LEUKOCYTE ESTERASE UR QL STRIP: NORMAL
NITRITE UR QL STRIP: NORMAL
PH UR STRIP: 5.5
PROT UR STRIP-MCNC: NORMAL
SP GR UR STRIP: 1

## 2024-07-11 PROCEDURE — 0502F SUBSEQUENT PRENATAL CARE: CPT

## 2024-07-11 PROCEDURE — 81003 URINALYSIS AUTO W/O SCOPE: CPT | Mod: NC,QW

## 2024-08-15 ENCOUNTER — APPOINTMENT (OUTPATIENT)
Dept: OBGYN | Facility: CLINIC | Age: 29
End: 2024-08-15
Payer: COMMERCIAL

## 2024-08-15 VITALS
SYSTOLIC BLOOD PRESSURE: 120 MMHG | HEIGHT: 66 IN | BODY MASS INDEX: 26.68 KG/M2 | WEIGHT: 166 LBS | DIASTOLIC BLOOD PRESSURE: 76 MMHG

## 2024-08-15 DIAGNOSIS — Z3A.19 19 WEEKS GESTATION OF PREGNANCY: ICD-10-CM

## 2024-08-15 DIAGNOSIS — O30.042 TWIN PREGNANCY, DICHORIONIC/DIAMNIOTIC, SECOND TRIMESTER: ICD-10-CM

## 2024-08-15 LAB
BILIRUB UR QL STRIP: NORMAL
CLARITY UR: CLEAR
COLLECTION METHOD: NORMAL
GLUCOSE UR-MCNC: NORMAL
HCG UR QL: 0.2 EU/DL
HGB UR QL STRIP.AUTO: NORMAL
KETONES UR-MCNC: NORMAL
LEUKOCYTE ESTERASE UR QL STRIP: NORMAL
NITRITE UR QL STRIP: NORMAL
PH UR STRIP: 6
PROT UR STRIP-MCNC: NORMAL
SP GR UR STRIP: 1.01

## 2024-08-15 PROCEDURE — 0502F SUBSEQUENT PRENATAL CARE: CPT

## 2024-08-15 PROCEDURE — 81003 URINALYSIS AUTO W/O SCOPE: CPT | Mod: NC,QW

## 2024-08-19 ENCOUNTER — APPOINTMENT (OUTPATIENT)
Dept: ANTEPARTUM | Facility: CLINIC | Age: 29
End: 2024-08-19

## 2024-08-19 LAB
AF-AFP DISCLAIMER: NORMAL
AFP  MOM: 1.86
AFP CONCENTRATION: 96.46 NG/ML
AFP INTERPRETATION: NORMAL
AFP MOM CUT-OFF: 5
AFP PERCENTILE: NORMAL
AFP SCREENING RESULT: NORMAL
AFTER SCREENING RISK OPEN SPINA BIFIDA: NORMAL
BEFORE SCREENING RISK OPEN SPINA BIFIDA: NORMAL
CARBAMAZEPINE?: NO
CURRENT SMOKER: NORMAL
ESTIMATED DUE DATE: NORMAL
EXTREME ANALYTE ALERT: NO
FAMILY HISTORY OPEN SPINA BIFIDA: NORMAL
GESTATIONAL  AGE: NORMAL
GESTATIONAL AGE METHOD: NORMAL
INSULIN DEPEND DIABETES: NORMAL
MATERNAL WGT: 157
MULTIPLE PREGNANCY STATUS: NORMAL
RACE/ETHNICITY: NORMAL
VALPROIC ACID?: NORMAL

## 2024-08-20 ENCOUNTER — APPOINTMENT (OUTPATIENT)
Dept: ANTEPARTUM | Facility: CLINIC | Age: 29
End: 2024-08-20
Payer: COMMERCIAL

## 2024-08-20 ENCOUNTER — ASOB RESULT (OUTPATIENT)
Age: 29
End: 2024-08-20

## 2024-08-20 PROCEDURE — 76811 OB US DETAILED SNGL FETUS: CPT

## 2024-08-20 PROCEDURE — 76812 OB US DETAILED ADDL FETUS: CPT

## 2024-08-20 PROCEDURE — 76817 TRANSVAGINAL US OBSTETRIC: CPT

## 2024-08-21 ENCOUNTER — NON-APPOINTMENT (OUTPATIENT)
Age: 29
End: 2024-08-21

## 2024-09-12 ENCOUNTER — APPOINTMENT (OUTPATIENT)
Dept: OBGYN | Facility: CLINIC | Age: 29
End: 2024-09-12
Payer: COMMERCIAL

## 2024-09-12 VITALS
SYSTOLIC BLOOD PRESSURE: 100 MMHG | WEIGHT: 170 LBS | BODY MASS INDEX: 27.32 KG/M2 | HEIGHT: 66 IN | DIASTOLIC BLOOD PRESSURE: 70 MMHG

## 2024-09-12 DIAGNOSIS — Z3A.23 23 WEEKS GESTATION OF PREGNANCY: ICD-10-CM

## 2024-09-12 LAB
BILIRUB UR QL STRIP: NORMAL
CLARITY UR: CLEAR
COLLECTION METHOD: NORMAL
GLUCOSE UR-MCNC: NORMAL
HCG UR QL: 0.2 EU/DL
HGB UR QL STRIP.AUTO: NORMAL
KETONES UR-MCNC: NORMAL
LEUKOCYTE ESTERASE UR QL STRIP: NORMAL
NITRITE UR QL STRIP: NORMAL
PH UR STRIP: 7
PROT UR STRIP-MCNC: NORMAL
SP GR UR STRIP: 1.02

## 2024-09-12 PROCEDURE — 81003 URINALYSIS AUTO W/O SCOPE: CPT | Mod: NC,QW

## 2024-09-12 PROCEDURE — 0502F SUBSEQUENT PRENATAL CARE: CPT

## 2024-09-19 ENCOUNTER — APPOINTMENT (OUTPATIENT)
Dept: ANTEPARTUM | Facility: CLINIC | Age: 29
End: 2024-09-19
Payer: COMMERCIAL

## 2024-09-19 ENCOUNTER — ASOB RESULT (OUTPATIENT)
Age: 29
End: 2024-09-19

## 2024-09-19 PROCEDURE — 76816 OB US FOLLOW-UP PER FETUS: CPT | Mod: 59

## 2024-09-23 ENCOUNTER — TRANSCRIPTION ENCOUNTER (OUTPATIENT)
Age: 29
End: 2024-09-23

## 2024-09-23 ENCOUNTER — NON-APPOINTMENT (OUTPATIENT)
Age: 29
End: 2024-09-23

## 2024-10-10 ENCOUNTER — APPOINTMENT (OUTPATIENT)
Dept: OBGYN | Facility: CLINIC | Age: 29
End: 2024-10-10

## 2024-10-10 DIAGNOSIS — Z34.92 ENCOUNTER FOR SUPERVISION OF NORMAL PREGNANCY, UNSPECIFIED, SECOND TRIMESTER: ICD-10-CM

## 2024-10-10 LAB
BILIRUB UR QL STRIP: NORMAL
CLARITY UR: CLEAR
COLLECTION METHOD: NORMAL
GLUCOSE UR-MCNC: NORMAL
HCG UR QL: 0.2 EU/DL
HGB UR QL STRIP.AUTO: NORMAL
KETONES UR-MCNC: NORMAL
LEUKOCYTE ESTERASE UR QL STRIP: NORMAL
NITRITE UR QL STRIP: NORMAL
PH UR STRIP: 7
PROT UR STRIP-MCNC: NORMAL
SP GR UR STRIP: 1.01

## 2024-10-10 PROCEDURE — 0502F SUBSEQUENT PRENATAL CARE: CPT

## 2024-10-10 PROCEDURE — 36415 COLL VENOUS BLD VENIPUNCTURE: CPT

## 2024-10-10 PROCEDURE — 81003 URINALYSIS AUTO W/O SCOPE: CPT | Mod: NC,QW

## 2024-10-11 ENCOUNTER — TRANSCRIPTION ENCOUNTER (OUTPATIENT)
Age: 29
End: 2024-10-11

## 2024-10-11 DIAGNOSIS — Z34.92 ENCOUNTER FOR SUPERVISION OF NORMAL PREGNANCY, UNSPECIFIED, SECOND TRIMESTER: ICD-10-CM

## 2024-10-14 LAB
BASOPHILS # BLD AUTO: 0.05 K/UL
BASOPHILS NFR BLD AUTO: 0.4 %
EOSINOPHIL # BLD AUTO: 0.09 K/UL
EOSINOPHIL NFR BLD AUTO: 0.8 %
GLUCOSE 1H P 50 G GLC PO SERPL-MCNC: 148 MG/DL
HCT VFR BLD CALC: 36.1 %
HGB BLD-MCNC: 11.7 G/DL
IMM GRANULOCYTES NFR BLD AUTO: 1 %
LYMPHOCYTES # BLD AUTO: 1.92 K/UL
LYMPHOCYTES NFR BLD AUTO: 16.4 %
MAN DIFF?: NORMAL
MCHC RBC-ENTMCNC: 32.4 GM/DL
MCHC RBC-ENTMCNC: 33 PG
MCV RBC AUTO: 101.7 FL
MONOCYTES # BLD AUTO: 0.9 K/UL
MONOCYTES NFR BLD AUTO: 7.7 %
NEUTROPHILS # BLD AUTO: 8.63 K/UL
NEUTROPHILS NFR BLD AUTO: 73.7 %
PLATELET # BLD AUTO: 189 K/UL
RBC # BLD: 3.55 M/UL
RBC # FLD: 14.7 %
T PALLIDUM AB SER QL IA: NEGATIVE
WBC # FLD AUTO: 11.71 K/UL

## 2024-10-16 ENCOUNTER — TRANSCRIPTION ENCOUNTER (OUTPATIENT)
Age: 29
End: 2024-10-16

## 2024-10-18 ENCOUNTER — APPOINTMENT (OUTPATIENT)
Dept: ANTEPARTUM | Facility: CLINIC | Age: 29
End: 2024-10-18
Payer: COMMERCIAL

## 2024-10-18 ENCOUNTER — ASOB RESULT (OUTPATIENT)
Age: 29
End: 2024-10-18

## 2024-10-18 PROCEDURE — 76816 OB US FOLLOW-UP PER FETUS: CPT | Mod: 59

## 2024-10-21 ENCOUNTER — TRANSCRIPTION ENCOUNTER (OUTPATIENT)
Age: 29
End: 2024-10-21

## 2024-10-29 ENCOUNTER — NON-APPOINTMENT (OUTPATIENT)
Age: 29
End: 2024-10-29

## 2024-10-31 ENCOUNTER — APPOINTMENT (OUTPATIENT)
Dept: OBGYN | Facility: CLINIC | Age: 29
End: 2024-10-31
Payer: COMMERCIAL

## 2024-10-31 VITALS
SYSTOLIC BLOOD PRESSURE: 110 MMHG | DIASTOLIC BLOOD PRESSURE: 70 MMHG | BODY MASS INDEX: 28.61 KG/M2 | HEIGHT: 66 IN | WEIGHT: 178 LBS

## 2024-10-31 DIAGNOSIS — Z3A.30 30 WEEKS GESTATION OF PREGNANCY: ICD-10-CM

## 2024-10-31 DIAGNOSIS — O30.003 TWIN PREGNANCY, UNSPECIFIED NUMBER OF PLACENTA AND UNSPECIFIED NUMBER OF AMNIOTIC SACS, THIRD TRIMESTER: ICD-10-CM

## 2024-10-31 PROCEDURE — 81003 URINALYSIS AUTO W/O SCOPE: CPT | Mod: QW

## 2024-10-31 PROCEDURE — 90715 TDAP VACCINE 7 YRS/> IM: CPT

## 2024-10-31 PROCEDURE — 90471 IMMUNIZATION ADMIN: CPT

## 2024-10-31 PROCEDURE — 0502F SUBSEQUENT PRENATAL CARE: CPT

## 2024-11-04 ENCOUNTER — NON-APPOINTMENT (OUTPATIENT)
Age: 29
End: 2024-11-04

## 2024-11-06 ENCOUNTER — TRANSCRIPTION ENCOUNTER (OUTPATIENT)
Age: 29
End: 2024-11-06

## 2024-11-12 ENCOUNTER — NON-APPOINTMENT (OUTPATIENT)
Age: 29
End: 2024-11-12

## 2024-11-14 ENCOUNTER — APPOINTMENT (OUTPATIENT)
Dept: OBGYN | Facility: CLINIC | Age: 29
End: 2024-11-14

## 2024-11-14 VITALS
WEIGHT: 128 LBS | SYSTOLIC BLOOD PRESSURE: 130 MMHG | DIASTOLIC BLOOD PRESSURE: 80 MMHG | HEIGHT: 66 IN | BODY MASS INDEX: 20.57 KG/M2

## 2024-11-14 DIAGNOSIS — Z23 ENCOUNTER FOR IMMUNIZATION: ICD-10-CM

## 2024-11-14 DIAGNOSIS — O30.003 TWIN PREGNANCY, UNSPECIFIED NUMBER OF PLACENTA AND UNSPECIFIED NUMBER OF AMNIOTIC SACS, THIRD TRIMESTER: ICD-10-CM

## 2024-11-14 PROCEDURE — 90678 RSV VACC PREF BIVALENT IM: CPT

## 2024-11-14 PROCEDURE — 0502F SUBSEQUENT PRENATAL CARE: CPT

## 2024-11-14 PROCEDURE — 90471 IMMUNIZATION ADMIN: CPT

## 2024-11-18 ENCOUNTER — APPOINTMENT (OUTPATIENT)
Dept: ANTEPARTUM | Facility: CLINIC | Age: 29
End: 2024-11-18
Payer: COMMERCIAL

## 2024-11-18 ENCOUNTER — ASOB RESULT (OUTPATIENT)
Age: 29
End: 2024-11-18

## 2024-11-18 PROCEDURE — 76818 FETAL BIOPHYS PROFILE W/NST: CPT | Mod: 59

## 2024-11-18 PROCEDURE — 76816 OB US FOLLOW-UP PER FETUS: CPT | Mod: 59

## 2024-11-18 PROCEDURE — 76820 UMBILICAL ARTERY ECHO: CPT | Mod: 59

## 2024-11-21 ENCOUNTER — NON-APPOINTMENT (OUTPATIENT)
Age: 29
End: 2024-11-21

## 2024-11-21 ENCOUNTER — APPOINTMENT (OUTPATIENT)
Dept: OBGYN | Facility: CLINIC | Age: 29
End: 2024-11-21

## 2024-11-21 ENCOUNTER — OUTPATIENT (OUTPATIENT)
Dept: OUTPATIENT SERVICES | Facility: HOSPITAL | Age: 29
LOS: 1 days | End: 2024-11-21

## 2024-11-21 ENCOUNTER — RESULT REVIEW (OUTPATIENT)
Age: 29
End: 2024-11-21

## 2024-11-21 ENCOUNTER — APPOINTMENT (OUTPATIENT)
Dept: ULTRASOUND IMAGING | Facility: CLINIC | Age: 29
End: 2024-11-21
Payer: COMMERCIAL

## 2024-11-21 DIAGNOSIS — O12.03 GESTATIONAL EDEMA, THIRD TRIMESTER: ICD-10-CM

## 2024-11-21 PROCEDURE — 93970 EXTREMITY STUDY: CPT | Mod: 26

## 2024-11-25 ENCOUNTER — TRANSCRIPTION ENCOUNTER (OUTPATIENT)
Age: 29
End: 2024-11-25

## 2024-11-26 ENCOUNTER — NON-APPOINTMENT (OUTPATIENT)
Age: 29
End: 2024-11-26

## 2024-11-26 ENCOUNTER — APPOINTMENT (OUTPATIENT)
Dept: ANTEPARTUM | Facility: CLINIC | Age: 29
End: 2024-11-26

## 2024-11-26 ENCOUNTER — APPOINTMENT (OUTPATIENT)
Dept: VASCULAR SURGERY | Facility: CLINIC | Age: 29
End: 2024-11-26
Payer: COMMERCIAL

## 2024-11-26 DIAGNOSIS — O22.30 DEEP PHLEBOTHROMBOSIS IN PREGNANCY, UNSPECIFIED TRIMESTER: ICD-10-CM

## 2024-11-26 DIAGNOSIS — M79.89 OTHER SPECIFIED SOFT TISSUE DISORDERS: ICD-10-CM

## 2024-11-26 PROCEDURE — 93971 EXTREMITY STUDY: CPT

## 2024-11-26 PROCEDURE — 99203 OFFICE O/P NEW LOW 30 MIN: CPT

## 2024-11-26 RX ORDER — ENOXAPARIN SODIUM 80 MG/.8ML
80 INJECTION, SOLUTION SUBCUTANEOUS TWICE DAILY
Qty: 30 | Refills: 0 | Status: ACTIVE | COMMUNITY
Start: 2024-11-26 | End: 1900-01-01

## 2024-11-27 ENCOUNTER — TRANSCRIPTION ENCOUNTER (OUTPATIENT)
Age: 29
End: 2024-11-27

## 2024-11-29 ENCOUNTER — INPATIENT (INPATIENT)
Facility: HOSPITAL | Age: 29
LOS: 3 days | Discharge: HOME | End: 2024-12-03
Attending: OBSTETRICS & GYNECOLOGY | Admitting: OBSTETRICS & GYNECOLOGY
Payer: COMMERCIAL

## 2024-11-29 VITALS
TEMPERATURE: 98 F | HEIGHT: 65 IN | DIASTOLIC BLOOD PRESSURE: 84 MMHG | OXYGEN SATURATION: 99 % | HEART RATE: 95 BPM | RESPIRATION RATE: 18 BRPM | WEIGHT: 182.1 LBS | SYSTOLIC BLOOD PRESSURE: 132 MMHG

## 2024-11-29 DIAGNOSIS — O26.899 OTHER SPECIFIED PREGNANCY RELATED CONDITIONS, UNSPECIFIED TRIMESTER: ICD-10-CM

## 2024-11-29 DIAGNOSIS — Z3A.00 WEEKS OF GESTATION OF PREGNANCY NOT SPECIFIED: ICD-10-CM

## 2024-11-29 PROBLEM — Z78.9 OTHER SPECIFIED HEALTH STATUS: Chronic | Status: INACTIVE | Noted: 2020-02-28 | Resolved: 2024-11-29

## 2024-11-29 LAB
APTT BLD: 33 SEC — SIGNIFICANT CHANGE UP (ref 24.5–35.6)
BASOPHILS # BLD AUTO: 0.05 K/UL — SIGNIFICANT CHANGE UP (ref 0–0.2)
BASOPHILS NFR BLD AUTO: 0.4 % — SIGNIFICANT CHANGE UP (ref 0–2)
EOSINOPHIL # BLD AUTO: 0.13 K/UL — SIGNIFICANT CHANGE UP (ref 0–0.5)
EOSINOPHIL NFR BLD AUTO: 1 % — SIGNIFICANT CHANGE UP (ref 0–6)
FIBRINOGEN PPP-MCNC: 924 MG/DL — HIGH (ref 200–435)
HCT VFR BLD CALC: 32.3 % — LOW (ref 34.5–45)
HGB BLD-MCNC: 11.2 G/DL — LOW (ref 11.5–15.5)
IMM GRANULOCYTES NFR BLD AUTO: 0.7 % — SIGNIFICANT CHANGE UP (ref 0–0.9)
INR BLD: 0.98 RATIO — SIGNIFICANT CHANGE UP (ref 0.85–1.16)
LYMPHOCYTES # BLD AUTO: 2.53 K/UL — SIGNIFICANT CHANGE UP (ref 1–3.3)
LYMPHOCYTES # BLD AUTO: 20 % — SIGNIFICANT CHANGE UP (ref 13–44)
MCHC RBC-ENTMCNC: 32.4 PG — SIGNIFICANT CHANGE UP (ref 27–34)
MCHC RBC-ENTMCNC: 34.7 G/DL — SIGNIFICANT CHANGE UP (ref 32–36)
MCV RBC AUTO: 93.4 FL — SIGNIFICANT CHANGE UP (ref 80–100)
MONOCYTES # BLD AUTO: 0.98 K/UL — HIGH (ref 0–0.9)
MONOCYTES NFR BLD AUTO: 7.7 % — SIGNIFICANT CHANGE UP (ref 2–14)
NEUTROPHILS # BLD AUTO: 8.89 K/UL — HIGH (ref 1.8–7.4)
NEUTROPHILS NFR BLD AUTO: 70.2 % — SIGNIFICANT CHANGE UP (ref 43–77)
PLATELET # BLD AUTO: 262 K/UL — SIGNIFICANT CHANGE UP (ref 150–400)
PROTHROM AB SERPL-ACNC: 11.3 SEC — SIGNIFICANT CHANGE UP (ref 9.9–13.4)
RBC # BLD: 3.46 M/UL — LOW (ref 3.8–5.2)
RBC # FLD: 13.2 % — SIGNIFICANT CHANGE UP (ref 10.3–14.5)
T PALLIDUM AB TITR SER: NEGATIVE — SIGNIFICANT CHANGE UP
WBC # BLD: 12.67 K/UL — HIGH (ref 3.8–10.5)
WBC # FLD AUTO: 12.67 K/UL — HIGH (ref 3.8–10.5)

## 2024-11-29 PROCEDURE — 86850 RBC ANTIBODY SCREEN: CPT

## 2024-11-29 PROCEDURE — 86923 COMPATIBILITY TEST ELECTRIC: CPT

## 2024-11-29 PROCEDURE — 85730 THROMBOPLASTIN TIME PARTIAL: CPT

## 2024-11-29 PROCEDURE — 86901 BLOOD TYPING SEROLOGIC RH(D): CPT

## 2024-11-29 PROCEDURE — 86900 BLOOD TYPING SEROLOGIC ABO: CPT

## 2024-11-29 PROCEDURE — 94760 N-INVAS EAR/PLS OXIMETRY 1: CPT

## 2024-11-29 PROCEDURE — 36415 COLL VENOUS BLD VENIPUNCTURE: CPT

## 2024-11-29 PROCEDURE — C1889: CPT

## 2024-11-29 PROCEDURE — 85025 COMPLETE CBC W/AUTO DIFF WBC: CPT

## 2024-11-29 PROCEDURE — 85027 COMPLETE CBC AUTOMATED: CPT

## 2024-11-29 PROCEDURE — 85384 FIBRINOGEN ACTIVITY: CPT

## 2024-11-29 PROCEDURE — 88307 TISSUE EXAM BY PATHOLOGIST: CPT | Mod: 26

## 2024-11-29 PROCEDURE — 85610 PROTHROMBIN TIME: CPT

## 2024-11-29 PROCEDURE — 99214 OFFICE O/P EST MOD 30 MIN: CPT

## 2024-11-29 PROCEDURE — 59510 CESAREAN DELIVERY: CPT

## 2024-11-29 PROCEDURE — 59050 FETAL MONITOR W/REPORT: CPT

## 2024-11-29 PROCEDURE — 88307 TISSUE EXAM BY PATHOLOGIST: CPT

## 2024-11-29 PROCEDURE — 86780 TREPONEMA PALLIDUM: CPT

## 2024-11-29 RX ORDER — ACETAMINOPHEN 500MG 500 MG/1
975 TABLET, COATED ORAL
Refills: 0 | Status: DISCONTINUED | OUTPATIENT
Start: 2024-11-29 | End: 2024-12-03

## 2024-11-29 RX ORDER — OXYCODONE HYDROCHLORIDE 30 MG/1
5 TABLET ORAL ONCE
Refills: 0 | Status: DISCONTINUED | OUTPATIENT
Start: 2024-11-29 | End: 2024-12-03

## 2024-11-29 RX ORDER — 0.9 % SODIUM CHLORIDE 0.9 %
1000 INTRAVENOUS SOLUTION INTRAVENOUS
Refills: 0 | Status: DISCONTINUED | OUTPATIENT
Start: 2024-11-29 | End: 2024-12-03

## 2024-11-29 RX ORDER — ACETAMINOPHEN 500MG 500 MG/1
1000 TABLET, COATED ORAL ONCE
Refills: 0 | Status: COMPLETED | OUTPATIENT
Start: 2024-11-29 | End: 2024-11-29

## 2024-11-29 RX ORDER — AZITHROMYCIN 250 MG/1
500 TABLET, FILM COATED ORAL ONCE
Refills: 0 | Status: COMPLETED | OUTPATIENT
Start: 2024-11-29 | End: 2024-11-29

## 2024-11-29 RX ORDER — FAMOTIDINE 20 MG/1
20 TABLET, FILM COATED ORAL ONCE
Refills: 0 | Status: COMPLETED | OUTPATIENT
Start: 2024-11-29 | End: 2024-11-29

## 2024-11-29 RX ORDER — IBUPROFEN 200 MG
600 TABLET ORAL EVERY 6 HOURS
Refills: 0 | Status: COMPLETED | OUTPATIENT
Start: 2024-11-29 | End: 2025-10-28

## 2024-11-29 RX ORDER — TETANUS TOXOID, REDUCED DIPHTHERIA TOXOID AND ACELLULAR PERTUSSIS VACCINE, ADSORBED 5; 2.5; 8; 8; 2.5 [IU]/.5ML; [IU]/.5ML; UG/.5ML; UG/.5ML; UG/.5ML
0.5 SUSPENSION INTRAMUSCULAR ONCE
Refills: 0 | Status: DISCONTINUED | OUTPATIENT
Start: 2024-11-29 | End: 2024-12-03

## 2024-11-29 RX ORDER — HYDROMORPHONE HYDROCHLORIDE 2 MG/1
30 TABLET ORAL
Refills: 0 | Status: DISCONTINUED | OUTPATIENT
Start: 2024-11-29 | End: 2024-11-29

## 2024-11-29 RX ORDER — OXYCODONE HYDROCHLORIDE 30 MG/1
5 TABLET ORAL
Refills: 0 | Status: DISCONTINUED | OUTPATIENT
Start: 2024-11-29 | End: 2024-12-03

## 2024-11-29 RX ORDER — INFLUENZA VIRUS VACCINE 15; 15; 15; 15 UG/.5ML; UG/.5ML; UG/.5ML; UG/.5ML
0.5 SUSPENSION INTRAMUSCULAR ONCE
Refills: 0 | Status: DISCONTINUED | OUTPATIENT
Start: 2024-11-29 | End: 2024-12-03

## 2024-11-29 RX ORDER — DIPHENHYDRAMINE HCL 25 MG
25 CAPSULE ORAL EVERY 6 HOURS
Refills: 0 | Status: DISCONTINUED | OUTPATIENT
Start: 2024-11-29 | End: 2024-12-03

## 2024-11-29 RX ORDER — SIMETHICONE 125 MG
80 CAPSULE ORAL EVERY 4 HOURS
Refills: 0 | Status: DISCONTINUED | OUTPATIENT
Start: 2024-11-29 | End: 2024-12-03

## 2024-11-29 RX ORDER — KETOROLAC TROMETHAMINE 30 MG/ML
30 INJECTION INTRAMUSCULAR; INTRAVENOUS EVERY 6 HOURS
Refills: 0 | Status: DISCONTINUED | OUTPATIENT
Start: 2024-11-29 | End: 2024-11-30

## 2024-11-29 RX ORDER — TRISODIUM CITRATE DIHYDRATE AND CITRIC ACID MONOHYDRATE 500; 334 MG/5ML; MG/5ML
30 SOLUTION ORAL ONCE
Refills: 0 | Status: COMPLETED | OUTPATIENT
Start: 2024-11-29 | End: 2024-11-29

## 2024-11-29 RX ORDER — ENOXAPARIN SODIUM 30 MG/.3ML
70 INJECTION SUBCUTANEOUS EVERY 12 HOURS
Refills: 0 | Status: DISCONTINUED | OUTPATIENT
Start: 2024-11-29 | End: 2024-12-03

## 2024-11-29 RX ORDER — 0.9 % SODIUM CHLORIDE 0.9 %
1000 INTRAVENOUS SOLUTION INTRAVENOUS
Refills: 0 | Status: DISCONTINUED | OUTPATIENT
Start: 2024-11-29 | End: 2024-11-29

## 2024-11-29 RX ORDER — CHLORHEXIDINE GLUCONATE 1.2 MG/ML
1 RINSE ORAL DAILY
Refills: 0 | Status: DISCONTINUED | OUTPATIENT
Start: 2024-11-29 | End: 2024-11-29

## 2024-11-29 RX ORDER — LANOLIN 72 %
1 OINTMENT (GRAM) TOPICAL EVERY 6 HOURS
Refills: 0 | Status: DISCONTINUED | OUTPATIENT
Start: 2024-11-29 | End: 2024-12-03

## 2024-11-29 RX ORDER — ACETAMINOPHEN 500MG 500 MG/1
1000 TABLET, COATED ORAL ONCE
Refills: 0 | Status: DISCONTINUED | OUTPATIENT
Start: 2024-11-29 | End: 2024-11-29

## 2024-11-29 RX ADMIN — Medication 10 UNIT(S): at 05:23

## 2024-11-29 RX ADMIN — HYDROMORPHONE HYDROCHLORIDE 30 MILLILITER(S): 2 TABLET ORAL at 07:59

## 2024-11-29 RX ADMIN — ACETAMINOPHEN 500MG 400 MILLIGRAM(S): 500 TABLET, COATED ORAL at 07:21

## 2024-11-29 RX ADMIN — KETOROLAC TROMETHAMINE 30 MILLIGRAM(S): 30 INJECTION INTRAMUSCULAR; INTRAVENOUS at 07:21

## 2024-11-29 RX ADMIN — HYDROMORPHONE HYDROCHLORIDE 30 MILLILITER(S): 2 TABLET ORAL at 09:15

## 2024-11-29 RX ADMIN — Medication 125 MILLILITER(S): at 04:24

## 2024-11-29 RX ADMIN — ACETAMINOPHEN 500MG 400 MILLIGRAM(S): 500 TABLET, COATED ORAL at 20:07

## 2024-11-29 RX ADMIN — KETOROLAC TROMETHAMINE 30 MILLIGRAM(S): 30 INJECTION INTRAMUSCULAR; INTRAVENOUS at 18:03

## 2024-11-29 RX ADMIN — Medication 125 MILLILITER(S): at 18:40

## 2024-11-29 RX ADMIN — AZITHROMYCIN 255 MILLIGRAM(S): 250 TABLET, FILM COATED ORAL at 04:00

## 2024-11-29 RX ADMIN — TRISODIUM CITRATE DIHYDRATE AND CITRIC ACID MONOHYDRATE 30 MILLILITER(S): 500; 334 SOLUTION ORAL at 04:23

## 2024-11-29 RX ADMIN — FAMOTIDINE 20 MILLIGRAM(S): 20 TABLET, FILM COATED ORAL at 04:23

## 2024-11-29 RX ADMIN — ENOXAPARIN SODIUM 70 MILLIGRAM(S): 30 INJECTION SUBCUTANEOUS at 18:05

## 2024-11-29 RX ADMIN — Medication 0.2 MILLIGRAM(S): at 05:21

## 2024-11-29 RX ADMIN — Medication 42 MILLIUNIT(S)/MIN: at 06:55

## 2024-11-29 RX ADMIN — ACETAMINOPHEN 500MG 1000 MILLIGRAM(S): 500 TABLET, COATED ORAL at 20:37

## 2024-11-29 NOTE — OB PROVIDER DELIVERY SUMMARY - NSSELHIDDEN_OBGYN_ALL_OB_FT
[NS_DeliveryAttending1_OBGYN_ALL_OB_FT:[id number]==],[NS_DeliveryRN_OBGYN_ALL_OB_FT:[id number]=]

## 2024-11-29 NOTE — OB PROVIDER H&P - NSPRIMARYCAREPROV_OBGYN_ALL_OB
Chart review , case discussion with other provider,  examination of patient , answering questions and concerns , reviewing/ ordering labs and medications , and documentation MD//ARLETH/ARCHANA

## 2024-11-29 NOTE — OB RN INTRAOPERATIVE NOTE - NSSELHIDDEN_OBGYN_ALL_OB_FT
[NS_DeliveryAttending1_OBGYN_ALL_OB_FT:[id number]==],[NS_DeliveryRN_OBGYN_ALL_OB_FT:[id number]=]

## 2024-11-29 NOTE — OB NEONATOLOGY/PEDIATRICIAN DELIVERY SUMMARY - NSPEDSNEONOTESA_OBGYN_ALL_OB_FT
ETHAN Almaraz is a 34.6 week twin A male infant born to a 31 yo  A+, RI, Hep B neg, RPR NR , Hep C neg, HIV neg, GBS neg at 34.6 weeks born via C/S secondary to PROM, PTL and twin B in breech presentation.  Maternal medical history significant for miscarriage in . L ACL repair,  and DVT diagnosed on  for which mom is currently on Lovenox.  Mother presented to the hospital on  with complaints of ROM at 0130 and contractions.      Baby Boy born via C/S in vertex position.  He had a spontaneous cry at the delivery table.  He was brought to the warmer where he received routine  care.  Baby was transferred to the NICU secondary to prematurity.

## 2024-11-29 NOTE — OB RN DELIVERY SUMMARY - BABY B: APGAR 5 MIN RESP RATE, DELIVERY
"  Outpatient Psychiatry Follow-Up Visit (MD/NP)    6/28/2022    Clinical Status of Patient:  Outpatient (Ambulatory)    Chief Complaint:  Neha Hilario is a 24 y.o. female who presents today for follow-up of anxiety.  Met with patient.      Current Medications:  Cymbalta 90 mg  Propanolol 20 mg  vraylar 3 mg  Trazodone 50 mg    Interval History and Content of Current Session:  Interim Events/Subjective Report/Content of Current Session:   Patient last seen by 4/28/2022    Patient was re-started on cymbalta 60 mg, switched from klonopin to ativan 0.5 mg BID, and propanolol 20 mg BID.    Patient presents to follow up after inpatient hospitalization at [facility] for SI. Patient started on vraylar 3 mg and trazodone 50 mg.    Patient reports she is feeing stable and her mood has improved. She feels the medications are working "really well" and states she "feels much better."    Her mood is "good." She denies symptoms of depression at this time, denies SI/thoughts of self harm. She has more energy and motivation.     She admits to a little anxiety yesterday returning to work but reports her generalized anxiety has greatly improved.     She is sleeping well, from 10 pm to 7 am. She wakes feeling rested and denies fatigue throughout the day.   She is eating 3 meals a day.     Patient is content with current medications. She does not wish to make changes, as she is feeling good and stable.     She denies SI/HI/AVH.    She has a follow up appt with Raquel July 21.     Pt is provided coupon for vraylar, as pt reports is expensive.         Psychotherapy:  · Target symptoms: depression, anxiety   · Why chosen therapy is appropriate versus another modality: relevant to diagnosis, evidence based practice  · Outcome monitoring methods: self-report, observation  · Therapeutic intervention type: insight oriented psychotherapy, supportive psychotherapy  · Topics discussed/themes: work stress, building skills sets for symptom " management, symptom recognition  · The patient's response to the intervention is accepting. The patient's progress toward treatment goals is good.   · Duration of intervention: 12 minutes.    Review of Systems   · PSYCHIATRIC: Pertinant items are noted in the narrative.    Past Medical, Family and Social History: The patient's past medical, family and social history have been reviewed and updated as appropriate within the electronic medical record - see encounter notes.    Compliance: yes    Side effects: None    Risk Parameters:  Patient reports no suicidal ideation  Patient reports no homicidal ideation  Patient reports no self-injurious behavior  Patient reports no violent behavior    Exam (detailed: at least 9 elements; comprehensive: all 15 elements)   Constitutional  Vitals:  Most recent vital signs, dated less than 90 days prior to this appointment, were reviewed.   Vitals:    06/28/22 0807   BP: 133/72   Pulse: 110   Weight: 128.5 kg (283 lb 4.7 oz)        General:  unremarkable, age appropriate, overweight     Musculoskeletal  Muscle Strength/Tone:  not examined   Gait & Station:  non-ataxic     Psychiatric  Speech:  no latency; no press   Mood & Affect:  steady, euthymic  congruent and appropriate   Thought Process:  normal and logical   Associations:  intact   Thought Content:  normal, no suicidality, no homicidality, delusions, or paranoia   Insight:  intact   Judgement: behavior is adequate to circumstances   Orientation:  grossly intact   Memory: intact for content of interview   Language: grossly intact   Attention Span & Concentration:  able to focus   Fund of Knowledge:  intact and appropriate to age and level of education     Assessment and Diagnosis   Status/Progress: Based on the examination today, the patient's problem(s) is/are improved.  New problems have not been presented today.   Lack of compliance are not complicating management of the primary condition.  There are no active rule-out  diagnoses for this patient at this time.     General Impression: Patient is a 24 year old female with a psychiatric history of generalized anxiety with panic attacks and moderate depressoin. Patient presents after hospitalization for SI. Patient has been started on vraylar 3 mg, trazodone 50 mg and propanolol 20 mg BID in addition to cymbalta 90 mg. Patient is doing well and is stable. Depression and anxiety have both greatly improved.       ICD-10-CM ICD-9-CM   1. Moderate episode of recurrent major depressive disorder  F33.1 296.32   2. Generalized anxiety disorder with panic attacks  F41.1 300.02    F41.0 300.01       Intervention/Counseling/Treatment Plan   · Medication Management: The risks and benefits of medication were discussed with the patient.   · Continue cymbalta 90 mg daily  · Continue Vraylar 3 mg daily  · Continue Trazodone 50 mg nightly  · Continue propanolol 20 mg BID  · Coupon for vraylar provided  Discussed with patient informed consent, risks vs. benefits, alternative treatments, side effect profile and the inherent unpredictability of individual responses to these treatments. The patient expresses understanding of the above and displays the capacity to agree with this current plan and had no other questions.  Encouraged patient to keep future appointments.   Encouraged patient to message or call with questions or concerns  Safety plan reviewed with patient for worsening condition or suicidal ideations. In the event of an emergency patient was advised to go to the emergency room.      Return to Clinic: 1 month         (2) good, crying

## 2024-11-29 NOTE — OB NEONATOLOGY/PEDIATRICIAN DELIVERY SUMMARY - NSPEDSNEONOTESB_OBGYN_ALL_OB_FT
BG Almaraz is a 34.6 week twin B female infant born to a 31 yo  A+, RI, Hep B neg, RPR NR , Hep C neg, HIV neg, GBS neg at 34.6 weeks born via C/S secondary to PROM, PTL and twin B in breech presentation.  Maternal medical history significant for miscarriage in . L ACL repair,  and DVT diagnosed on  for which mom is currently on Lovenox.  Mother presented to the hospital on  with complaints of ROM at 0130 and contractions.      C/S was done under general anesthesia. Baby Girl born via C/S in breech position.  She was blue, floppy and had no respiratory effort.  She was brought to the warmer where she received vigorous stimulation followed by immediate PPV.  After 2 breath of PPV, she let out a cry and was transitioned to CPAP +5 21% for another minute.  She remained stable once transitioned to RA. Baby transferred to NICU for further care and management of prematurity.

## 2024-11-29 NOTE — OB RN DELIVERY SUMMARY - NSSELHIDDEN_OBGYN_ALL_OB_FT
[NS_DeliveryAttending1_OBGYN_ALL_OB_FT:[id number]==],[NS_CirculateRN2_OBGYN_ALL_OB_FT:[id number]=]

## 2024-11-29 NOTE — OB RN DELIVERY SUMMARY - NS_SEPSISRSKCALC_OBGYN_ALL_OB_FT
EOS calculated successfully. EOS Risk Factor: 0.5/1000 live births ([facility] national incidence); GA=34w6d; Temp=98.2; ROM=3.783; GBS='Unknown'; Antibiotics='No antibiotics or any antibiotics < 2 hrs prior to birth'

## 2024-11-29 NOTE — OB PROVIDER H&P - HISTORY OF PRESENT ILLNESS
29y  at 34w6d GA by LMP consistent with trimester sono who presents to L&D for leakage of fluid and cramping.     Prenatal course is significant for:  Di-Di twins  DVT on lovenox    Patient denies vaginal bleeding. She endorses good fetal movement. Denies fevers, chills, nausea and vomiting. No other complaints at this time.     POB: SAB   PGYN: -fibroids, -ovarian cysts, denies STD hx, denies abnormal PAPs   PMH: L external iliac vein DVT  PSH: L ankle  SH: Denies EtOH, tobacco and illicit drug use during this pregnancy; feels safe at home   Meds: PNVs, lovenox 80BID  Allergies: NKDA    Sono: Vertex posterior, vertex anterior   EFW: g 1% discordance

## 2024-11-29 NOTE — OB PROVIDER H&P - NSHPPHYSICALEXAM_GEN_ALL_CORE
T(C): --  HR: --  BP: --  RR: --  SpO2: --  Gen: NAD, well-appearing   Abd: Soft, gravid  Ext: non-tender, non-edematous  SVE:  4/100/-2  Bedside sono: Baby A vertex, baby B breech  FHT:   Baby A: 130 baseline, moderate variability, + accels, -decels   Baby B: 140 baseline, moderate variability, + accels, -decels  [place]: q2-5 min Gen: NAD, well-appearing   Abd: Soft, gravid  Ext: non-tender, non-edematous  SVE:  4/100/-2  Bedside sono: Baby A vertex, baby B breech  FHT:   Baby A: 130 baseline, moderate variability, + accels, -decels   Baby B: 140 baseline, moderate variability, + accels, -decels  [place]: q2-5 min

## 2024-11-29 NOTE — OB RN DELIVERY SUMMARY - NS_GBSABXNUMOFDOSES_OBGYN_ALL_OB_NU
DATE OF OPERATION:  03/30/20 - ROOM #333

 

DATE OF BIRTH:  [birth year]

 

SURGEON:  Sebastian Rice MD

 

ASSISTANT:  GULSHAN Jerome

 

ANESTHESIOLOGIST:  Dr. Little.

 

ANESTHESIA:  General with local.

 

PRE-OP DIAGNOSIS:  Acute calculous cholecystitis.

 

POST-OP DIAGNOSIS:  Acute calculous cholecystitis.

 

OPERATIVE PROCEDURE:  Laparoscopic cholecystectomy.

 

ESTIMATED BLOOD LOSS:  Minimal.

 

IV FLUIDS:  1 L of crystalloid.

 

WOUND CLASSIFICATION:  3.

 

COMPLICATIONS:  None.

 

DRAINS:  None.

 

SPECIMEN:  Gallbladder.

 

FINDINGS:  Acute calculous cholecystitis with thickened edematous gallbladder 
wall also with some chronic inflammatory changes as well.  It was necessary to 
decompress the gallbladder off some light bile prior to grasping.

 

BRIEF HISTORY:  Ms. Eve Thompson is a 23-year-old woman who has had 
episodes of right upper quadrant abdominal pain intermittently over the past 8 
to 10 months. More recently, she has had persistent pain for the past several 
days.  She presented to the emergency room and was noted to have a normal white 
blood cell with an elevated 

C-reactive protein, but normal liver transaminases and bilirubin. An ultrasound 
showed mild thickening of the gallbladder wall with some pericholecystic fluid 
and no ductal dilatation as well as gallstones.

 

On physical exam, she had right upper quadrant abdominal pain persisted and she 
was now to undergo the laparoscopic cholecystectomy for acute calculous 
cholecystitis.

 

The procedure was discussed with the patient and the risks of, but not limited 
to, bleeding, infection, intraabdominal abscess formation, injury to peritoneal 
and retroperitoneal structures, common bile duct injury, bile leak, abscess 
formation, the risks of general anesthesia and blood clots were all explained.

 

DESCRIPTION OF PROCEDURE:  Written informed consent was obtained, the abdomen 
was marked with indelible ink, and preoperative antibiotics were administered.  
The patient was taken to the operating room and placed in the supine position. 
Sequential compression devices were placed on the lower extremities as well as 
a warming blanket on the upper body.

 

The abdomen was prepped and draped in the usual sterile fashion.

 

Time-out verification was completed.

 

A small transverse incision was made just below the umbilicus at the midline 
and the peritoneal cavity was entered under direct vision.  The 12 mm blunt 
port was inserted and the abdomen was insufflated to 12 mmHg.

 

Under direct vision, an 11 mm epigastric port was placed and two 5 mm ports 
were placed in the right side of the abdominal wall.

 

Insufflation was performed using the smoke evacuator system, which was the 
contained circulatory flow system and no carbon dioxide was manually vented 
through any of the ports throughout the case including decompression at the end.

 

Gallbladder was identified.  It was thick-walled, edematous, and inflamed 
consistent with acute calculous cholecystitis.  We were unable to grasp the 
gallbladder towards its tip to elevate this and an 18-gauge puncture needle was 
used and approximately 30 cc of light bile was removed consistent with hydrops.

 

This allowed us to grasp this and elevate the gallbladder up over the liver bed.

 

The thickened infundibulum was identified and the thickened peritoneum, which 
was quite edematous and somewhat adherent.  It was taken down along the medial 
and lateral aspect of the gallbladder.  It was obvious that there was both 
acute on chronic inflammation and this was somewhat tedious dissection, but I 
was able to identify the cystic duct and cystic artery as they entered the 
gallbladder.

 

Using the critical view technique, I took a considerable portion of the 
inferior part of the gallbladder through the edematous inflamed tissue off the 
liver bed to assure myself of these two structures.

 

The cystic artery was then doubly clipped and divided.

 

The cystic artery had no stone.  The cystic duct was slightly thickened, but 
did appear to have stones within it and a 10-mm clip applier was used to triply 
clip this and divide the cystic duct at its junction with the gallbladder.

 

The gallbladder was then removed from the liver bed with cautery.  This was 
somewhat tedious as well due to the acute on chronic inflammation.

 

Once this was complete, the gallbladder was placed in an Endo Catch bag.

 

The right upper quadrant was irrigated thoroughly and hemostasis was assured.

 

The gallbladder was then removed from the umbilical port.

 

No drains were placed.

 

The carbon dioxide intraabdominally was then allowed to recede through the 
smoke evacuator system and the ports were removed.  The umbilical fascia was 
closed with interrupted 0 Vicryl suture.  The skin at all 4 incisions was 
closed with a subcuticular 4-0 Monocryl suture.  Steri-Strips were applied.  
The patient tolerated the procedure well and was taken to the recovery room in 
stable condition.

 

 [id number]/CPS #: [id number]

[facility] CenterKODI
1

## 2024-11-29 NOTE — OB PROVIDER H&P - NSLOWPPHRISK_OBGYN_A_OB
No previous uterine incision/[place] Pregnancy/Less than or equal to 4 previous vaginal births/No known bleeding disorder/No history of postpartum hemorrhage/No other PPH risks indicated

## 2024-11-29 NOTE — OB RN PATIENT PROFILE - EDUCATION ON THE POTENTIAL RISKS AND IMPACT OF EARLY USE OF PACIFIERS ON THE ESTABLISHMENT OF BREASTFEEDING
- Untreated BCC on the right nasal tip - bx from [facility].\\n- Reviewed treatment options and encouraged pt to follow through with [facility]\\n- No definitive tx established d/t pending path results needed from [facility].
Detail Level: Zone
Statement Selected

## 2024-11-29 NOTE — CHART NOTE - NSCHARTNOTEFT_GEN_A_CORE
Fellow Note    I did not personally wevaluate this patient. Recommendations based on review of available chart and discussion with the inperson team. In summary this is a  at 34w6d in early labor at 4cm with ruptured membranes in the setting of vertex/breech twins with plans for a  delivery.     She developed a DVT this week and was started on lovenox 80 bid (therapeutic) which she last took at 2230.     At this time her coagulation profile is pending.     We recommend discussion with anesthesia about administration of protamine sulfate as it generally is not particularly effective in reversing lovenox but may be considered on a case by case basis. Once coagulation profile is available consideration should be made to give the patient prophylactic plasma to address the coagulopathy.     Tocolysis is not recommended. We recommend placing products on hold and alerting the blood bank of this patients presence and possibility of needing transfusion.     Serial compression devices should be avoided in the limb effected by DVT intraoperatively. Postpartum anticoagulation is recommended.     Please do not hesitate to contact us with further questions.   Mitchell LANCASTER Fellow  Perez MONTGOMERY Attending Fellow Note    I did not personally wevaluate this patient. Recommendations based on review of available chart and discussion with the inperson team. In summary this is a  at 34w6d in early labor at 4cm with ruptured membranes in the setting of vertex/breech twins with plans for a  delivery.     She developed a DVT this week and was started on lovenox 80 bid (therapeutic) which she last took at 2230.     At this time her coagulation profile is pending.     We recommend discussion with anesthesia about administration of protamine sulfate as it generally is not particularly effective in reversing lovenox but may be considered on a case by case basis. Once coagulation profile is available consideration should be made to give the patient prophylactic plasma to address the coagulopathy.     Tocolysis is not recommended. We recommend placing products on hold and alerting the blood bank of this patients presence and possibility of needing transfusion.     Serial compression devices should be avoided in the limb effected by DVT intraoperatively. Postpartum anticoagulation is recommended.     Please do not hesitate to contact us with further questions.   Mitchell LANCASTER Fellow  Dw Dr Duncan JOSE Attending    Addendum     Discussed with Dr Vang, recommend reinitiation of therapeutic anticoagulation at 12 hours postoperatively if bleeding is stable. Lovenox may be considered, dosing being 1mg/kg. If patient cannot be reweighed, her ideal body weight would make the dose 70u lovenox twice a day.    Mitchell Orozco DO

## 2024-11-29 NOTE — OB PROVIDER H&P - ATTENDING COMMENTS
Pt seen and examined.   Agree with note above.  Pt with DVT on lovenox 80 mg BID, last dose at 10:30 pm last night.  Grossly ruptured. Vertex / breech today on scan.  Pt is in labor and uncomfortable with contractions.   Pt offered and declines vaginal delivery and would like to proceed with c/section.  Anesthesiology aware and will proceed with general anesthesia.  Due to recent lovenox, pt aware of significant increased risk of bleeding and atony.  She accepts blood products.  Will have pRBC, FFP, cryo, platelets on hold to OR.  Discussed risk of hysterectomy if other measures to stop bleeding fail.  All questions answered.    PLEE

## 2024-11-29 NOTE — PROGRESS NOTE ADULT - SUBJECTIVE AND OBJECTIVE BOX
COLE CORNEJO is a 28yo now POD#0 s/p  section for twins at 34w6d with DVT anticoagulated on Lovenox 80 BID.    S:    The patient is sleeping peacefully, did not wake  She has been hemostatic since her  at 5am.    O:    T(C): 36.6 (24 @ 08:30), Max: 37 (24 @ 08:00)  HR: 73 (24 @ 08:30) (62 - 96)  BP: 117/72 (24 @ 08:30) (117/72 - 134/86)  RR: 18 (24 @ 08:30) (15 - 18)  SpO2: 98% (24 @ 08:30) (95% - 100%)    Gen: NAD, AOx3  Resp: breathing comfortably on RA  Abdomen:  Soft, appropriately tender  Incision: Clean/dry/intact with steris in place. pressure dressing removed.  Uterus:  Fundus firm below umbilicus  VE:  Lochia as expected                          11.2   12.67 )-----------( 262      ( 2024 03:48 )             32.3             24 @ 07:01  -  24 @ 07:00  --------------------------------------------------------  IN: 1500 mL / OUT: 655 mL / NET: 845 mL    24 @ 07:01  -  24 @ 12:40  --------------------------------------------------------  IN: 0 mL / OUT: 500 mL / NET: -500 mL        A/P:    Routine Postoperative and Postpartum care  -Hgb: _ -> repeat labs pending   -Advance to routine POD #0 care  -Continue routine postpartum and postoperative care and education    DVT  - Discussed with MFM, Dr. Duncan  - plan to restart therapeutic anticoagulation at 12hr postop if bleeding is stable  -Ok for lovenox based on weight  -If unable to weight, will start with ideal body weight of 70kg = 70 BID and change to weight based therapeutic dose once pt is able to be weighed     ED Inman MD

## 2024-11-29 NOTE — OB PROVIDER DELIVERY SUMMARY - NSPROVIDERDELIVERYNOTE_OBGYN_ALL_OB_FT
Twin pregnancy at 34.6 weeks in labor at 4 cm  Pt with history of left DVT, on lovenox  Twin A vertex apgars 9/9 2340 g male  Twin B breech apgars 7/8 2300 g female  Findings: normal ut tubes and ovaries bilaterally, mildly boggy uterus which responded to im pitocin and im methergine.  Closure: single layer on uterus  fibrillar placed on hysterotomy  adhesions: none  EBL: 505  In: 1500  Urine 150 cc clear urine at end of procedure  Mother to RR, newborns to NICU  Excellent hemostasis seen at the end of the procedure. Twin pregnancy at 34.6 weeks in labor at 4 cm  Pt with history of left DVT, on lovenox  Twin A vertex apgars 9/9 2340 g male  Twin B breech apgars 7/8 2300 g female  Findings: normal ut tubes and ovaries bilaterally, mildly boggy uterus which responded to im pitocin and im methergine.  Closure: single layer on uterus  fibrillar placed on hysterotomy  adhesions: none  EBL: 505  In: 1500  Urine 150 cc clear urine at end of procedure  Mother to RR, newborns to NICU  Excellent hemostasis seen at the end of the procedure.  Job # [id number]

## 2024-11-29 NOTE — OB PROVIDER H&P - ASSESSMENT
A/P:   -Admit to L&D for primary  section for di/di twin gestation  -Consent  -Admission labs  -NPO, except ice chips   -IV fluids  -Labor: SROM 100. Latent labor. Michael every 2-4 min.   -Fetus: Cat I tracing. Continuous toco and fetal monitoring.   -GBS: Unknown  -Analgesia: Consult  -On therapeutic lovenox for DVT; last dose 1030 PM    Discussed with Dr. Graff

## 2024-11-30 LAB
BASOPHILS # BLD AUTO: 0.05 K/UL — SIGNIFICANT CHANGE UP (ref 0–0.2)
BASOPHILS # BLD AUTO: 0.05 K/UL — SIGNIFICANT CHANGE UP (ref 0–0.2)
BASOPHILS NFR BLD AUTO: 0.4 % — SIGNIFICANT CHANGE UP (ref 0–2)
BASOPHILS NFR BLD AUTO: 0.4 % — SIGNIFICANT CHANGE UP (ref 0–2)
EOSINOPHIL # BLD AUTO: 0.15 K/UL — SIGNIFICANT CHANGE UP (ref 0–0.5)
EOSINOPHIL # BLD AUTO: 0.17 K/UL — SIGNIFICANT CHANGE UP (ref 0–0.5)
EOSINOPHIL NFR BLD AUTO: 1.3 % — SIGNIFICANT CHANGE UP (ref 0–6)
EOSINOPHIL NFR BLD AUTO: 1.3 % — SIGNIFICANT CHANGE UP (ref 0–6)
HCT VFR BLD CALC: 22.1 % — LOW (ref 34.5–45)
HCT VFR BLD CALC: 22.5 % — LOW (ref 34.5–45)
HGB BLD-MCNC: 7.5 G/DL — LOW (ref 11.5–15.5)
HGB BLD-MCNC: 7.8 G/DL — LOW (ref 11.5–15.5)
IMM GRANULOCYTES NFR BLD AUTO: 0.6 % — SIGNIFICANT CHANGE UP (ref 0–0.9)
IMM GRANULOCYTES NFR BLD AUTO: 0.8 % — SIGNIFICANT CHANGE UP (ref 0–0.9)
LYMPHOCYTES # BLD AUTO: 2.94 K/UL — SIGNIFICANT CHANGE UP (ref 1–3.3)
LYMPHOCYTES # BLD AUTO: 22.5 % — SIGNIFICANT CHANGE UP (ref 13–44)
LYMPHOCYTES # BLD AUTO: 27.3 % — SIGNIFICANT CHANGE UP (ref 13–44)
LYMPHOCYTES # BLD AUTO: 3.26 K/UL — SIGNIFICANT CHANGE UP (ref 1–3.3)
MCHC RBC-ENTMCNC: 32.3 PG — SIGNIFICANT CHANGE UP (ref 27–34)
MCHC RBC-ENTMCNC: 32.9 PG — SIGNIFICANT CHANGE UP (ref 27–34)
MCHC RBC-ENTMCNC: 33.9 G/DL — SIGNIFICANT CHANGE UP (ref 32–36)
MCHC RBC-ENTMCNC: 34.7 G/DL — SIGNIFICANT CHANGE UP (ref 32–36)
MCV RBC AUTO: 94.9 FL — SIGNIFICANT CHANGE UP (ref 80–100)
MCV RBC AUTO: 95.3 FL — SIGNIFICANT CHANGE UP (ref 80–100)
MONOCYTES # BLD AUTO: 0.79 K/UL — SIGNIFICANT CHANGE UP (ref 0–0.9)
MONOCYTES # BLD AUTO: 1.14 K/UL — HIGH (ref 0–0.9)
MONOCYTES NFR BLD AUTO: 6.6 % — SIGNIFICANT CHANGE UP (ref 2–14)
MONOCYTES NFR BLD AUTO: 8.7 % — SIGNIFICANT CHANGE UP (ref 2–14)
NEUTROPHILS # BLD AUTO: 7.62 K/UL — HIGH (ref 1.8–7.4)
NEUTROPHILS # BLD AUTO: 8.68 K/UL — HIGH (ref 1.8–7.4)
NEUTROPHILS NFR BLD AUTO: 63.8 % — SIGNIFICANT CHANGE UP (ref 43–77)
NEUTROPHILS NFR BLD AUTO: 66.3 % — SIGNIFICANT CHANGE UP (ref 43–77)
PLATELET # BLD AUTO: 270 K/UL — SIGNIFICANT CHANGE UP (ref 150–400)
PLATELET # BLD AUTO: 283 K/UL — SIGNIFICANT CHANGE UP (ref 150–400)
RBC # BLD: 2.32 M/UL — LOW (ref 3.8–5.2)
RBC # BLD: 2.37 M/UL — LOW (ref 3.8–5.2)
RBC # FLD: 13.5 % — SIGNIFICANT CHANGE UP (ref 10.3–14.5)
RBC # FLD: 13.6 % — SIGNIFICANT CHANGE UP (ref 10.3–14.5)
WBC # BLD: 11.94 K/UL — HIGH (ref 3.8–10.5)
WBC # BLD: 13.08 K/UL — HIGH (ref 3.8–10.5)
WBC # FLD AUTO: 11.94 K/UL — HIGH (ref 3.8–10.5)
WBC # FLD AUTO: 13.08 K/UL — HIGH (ref 3.8–10.5)

## 2024-11-30 RX ORDER — IBUPROFEN 200 MG
600 TABLET ORAL EVERY 6 HOURS
Refills: 0 | Status: DISCONTINUED | OUTPATIENT
Start: 2024-11-30 | End: 2024-12-03

## 2024-11-30 RX ORDER — FERROUS SULFATE 325(65) MG
325 TABLET ORAL DAILY
Refills: 0 | Status: DISCONTINUED | OUTPATIENT
Start: 2024-11-30 | End: 2024-12-03

## 2024-11-30 RX ADMIN — KETOROLAC TROMETHAMINE 30 MILLIGRAM(S): 30 INJECTION INTRAMUSCULAR; INTRAVENOUS at 06:07

## 2024-11-30 RX ADMIN — KETOROLAC TROMETHAMINE 30 MILLIGRAM(S): 30 INJECTION INTRAMUSCULAR; INTRAVENOUS at 13:00

## 2024-11-30 RX ADMIN — KETOROLAC TROMETHAMINE 30 MILLIGRAM(S): 30 INJECTION INTRAMUSCULAR; INTRAVENOUS at 18:01

## 2024-11-30 RX ADMIN — ACETAMINOPHEN 500MG 975 MILLIGRAM(S): 500 TABLET, COATED ORAL at 22:30

## 2024-11-30 RX ADMIN — ACETAMINOPHEN 500MG 975 MILLIGRAM(S): 500 TABLET, COATED ORAL at 09:50

## 2024-11-30 RX ADMIN — KETOROLAC TROMETHAMINE 30 MILLIGRAM(S): 30 INJECTION INTRAMUSCULAR; INTRAVENOUS at 00:28

## 2024-11-30 RX ADMIN — ACETAMINOPHEN 500MG 975 MILLIGRAM(S): 500 TABLET, COATED ORAL at 02:30

## 2024-11-30 RX ADMIN — ACETAMINOPHEN 500MG 975 MILLIGRAM(S): 500 TABLET, COATED ORAL at 15:13

## 2024-11-30 RX ADMIN — KETOROLAC TROMETHAMINE 30 MILLIGRAM(S): 30 INJECTION INTRAMUSCULAR; INTRAVENOUS at 00:58

## 2024-11-30 RX ADMIN — ACETAMINOPHEN 500MG 975 MILLIGRAM(S): 500 TABLET, COATED ORAL at 21:35

## 2024-11-30 RX ADMIN — ACETAMINOPHEN 500MG 975 MILLIGRAM(S): 500 TABLET, COATED ORAL at 08:52

## 2024-11-30 RX ADMIN — ACETAMINOPHEN 500MG 975 MILLIGRAM(S): 500 TABLET, COATED ORAL at 03:00

## 2024-11-30 RX ADMIN — ENOXAPARIN SODIUM 70 MILLIGRAM(S): 30 INJECTION SUBCUTANEOUS at 06:07

## 2024-11-30 RX ADMIN — KETOROLAC TROMETHAMINE 30 MILLIGRAM(S): 30 INJECTION INTRAMUSCULAR; INTRAVENOUS at 06:37

## 2024-11-30 RX ADMIN — ENOXAPARIN SODIUM 70 MILLIGRAM(S): 30 INJECTION SUBCUTANEOUS at 18:39

## 2024-11-30 RX ADMIN — KETOROLAC TROMETHAMINE 30 MILLIGRAM(S): 30 INJECTION INTRAMUSCULAR; INTRAVENOUS at 12:23

## 2024-11-30 RX ADMIN — ACETAMINOPHEN 500MG 975 MILLIGRAM(S): 500 TABLET, COATED ORAL at 16:07

## 2024-11-30 NOTE — PROGRESS NOTE ADULT - SUBJECTIVE AND OBJECTIVE BOX
COLE CORNEJO is a 29y  now POD#1 s/p p- section at 34w6d gestation, DVT on Lovenox, with di/di twins.     S:    No acute events overnight.   The patient has no complaints.  Pain controlled with current treatment regimen. Pt reports she just has pain with walking.   She is ambulating without difficulty and tolerating PO.   - flatus/-BM/+ voiding   She endorses appropriate lochia, which is decreasing.   She is breastfeeding.   She denies fevers, chills, nausea and vomiting.   She denies lightheadedness, dizziness, palpitations, chest pain and SOB.     O:    T(C): 36.2 (24 @ 05:05), Max: 37 (24 @ 08:00)  HR: 67 (24 @ 05:05) (62 - 95)  BP: 107/65 (24 @ 05:05) (104/53 - 125/86)  RR: 18 (24 @ 05:05) (16 - 18)  SpO2: 100% (24 @ 05:05) (95% - 100%)    Gen: NAD, AOx3, resting comfortably on room air   Abdomen:  Soft, non-tender, non-distended  Incision: Clean/dry/intact with __ in place   Uterus:  Fundus firm below umbilicus  VE:  Expected lochia  Ext:  b/l LE non-tender                           11.2   12.67 )-----------( 262      ( 2024 03:48 )             32.3

## 2024-12-01 RX ORDER — IBUPROFEN 200 MG
1 TABLET ORAL
Qty: 0 | Refills: 0 | DISCHARGE
Start: 2024-12-01

## 2024-12-01 RX ORDER — ACETAMINOPHEN 500MG 500 MG/1
3 TABLET, COATED ORAL
Qty: 0 | Refills: 0 | DISCHARGE
Start: 2024-12-01

## 2024-12-01 RX ORDER — SENNOSIDES 8.6 MG
2 TABLET ORAL AT BEDTIME
Refills: 0 | Status: DISCONTINUED | OUTPATIENT
Start: 2024-12-01 | End: 2024-12-03

## 2024-12-01 RX ORDER — FERROUS SULFATE 325(65) MG
1 TABLET ORAL
Qty: 0 | Refills: 0 | DISCHARGE
Start: 2024-12-01

## 2024-12-01 RX ADMIN — Medication 600 MILLIGRAM(S): at 18:54

## 2024-12-01 RX ADMIN — ACETAMINOPHEN 500MG 975 MILLIGRAM(S): 500 TABLET, COATED ORAL at 21:14

## 2024-12-01 RX ADMIN — Medication 600 MILLIGRAM(S): at 01:15

## 2024-12-01 RX ADMIN — ACETAMINOPHEN 500MG 975 MILLIGRAM(S): 500 TABLET, COATED ORAL at 22:10

## 2024-12-01 RX ADMIN — Medication 325 MILLIGRAM(S): at 09:11

## 2024-12-01 RX ADMIN — ACETAMINOPHEN 500MG 975 MILLIGRAM(S): 500 TABLET, COATED ORAL at 04:10

## 2024-12-01 RX ADMIN — Medication 600 MILLIGRAM(S): at 13:30

## 2024-12-01 RX ADMIN — Medication 600 MILLIGRAM(S): at 06:06

## 2024-12-01 RX ADMIN — ENOXAPARIN SODIUM 70 MILLIGRAM(S): 30 INJECTION SUBCUTANEOUS at 06:06

## 2024-12-01 RX ADMIN — Medication 600 MILLIGRAM(S): at 12:36

## 2024-12-01 RX ADMIN — Medication 600 MILLIGRAM(S): at 18:39

## 2024-12-01 RX ADMIN — Medication 600 MILLIGRAM(S): at 06:49

## 2024-12-01 RX ADMIN — ACETAMINOPHEN 500MG 975 MILLIGRAM(S): 500 TABLET, COATED ORAL at 09:11

## 2024-12-01 RX ADMIN — Medication 30 MILLILITER(S): at 21:14

## 2024-12-01 RX ADMIN — Medication 600 MILLIGRAM(S): at 00:19

## 2024-12-01 RX ADMIN — ACETAMINOPHEN 500MG 975 MILLIGRAM(S): 500 TABLET, COATED ORAL at 16:05

## 2024-12-01 RX ADMIN — ACETAMINOPHEN 500MG 975 MILLIGRAM(S): 500 TABLET, COATED ORAL at 03:15

## 2024-12-01 RX ADMIN — ENOXAPARIN SODIUM 70 MILLIGRAM(S): 30 INJECTION SUBCUTANEOUS at 18:39

## 2024-12-01 RX ADMIN — ACETAMINOPHEN 500MG 975 MILLIGRAM(S): 500 TABLET, COATED ORAL at 15:14

## 2024-12-01 RX ADMIN — ACETAMINOPHEN 500MG 975 MILLIGRAM(S): 500 TABLET, COATED ORAL at 10:10

## 2024-12-01 NOTE — DISCHARGE NOTE OB - PATIENT PORTAL LINK FT
You can access the FollowMyHealth Patient Portal offered by [facility] by registering at the following website: [email or web address]. By joining [facility]’s FollowMyHealth portal, you will also be able to view your health information using other applications (apps) compatible with our system.

## 2024-12-01 NOTE — DISCHARGE NOTE OB - HOSPITAL COURSE
29y  s/p p- section at 34w6d gestation, DVT on Lovenox, with di/di twins. Post-op course was uncomplicated. Pain is well controlled with PRN medication. She has no difficulty with ambulation, voiding, or PO intake. Lab values and vital signs are within normal limits prior to discharge.

## 2024-12-01 NOTE — DISCHARGE NOTE OB - FINANCIAL ASSISTANCE
[facility] provides services at a reduced cost to those who are determined to be eligible through [facility]’s financial assistance program. Information regarding [facility]’s financial assistance program can be found by going to [email or web address] or by calling [phone].

## 2024-12-01 NOTE — DISCHARGE NOTE OB - CARE PLAN
Principal Discharge DX:	Delivery of pregnancy by  section  Assessment and plan of treatment:	1) Please take ibuprofen and/or Tylenol as needed for pain as prescribed.  2) Nothing in the vagina for 6 weeks (including no sex, no tampons, and no douching).  3) Please call your doctor for a follow up your postpartum appointment in 1-2 weeks.  4) Please continue taking vitamins postpartum.   5) Please call the office sooner if you have heavy vaginal bleeding, severe abdominal pain, or fever > 100.4F.  6) You may resume regular daily activity as tolerated  Secondary Diagnosis:	DVT, lower extremity  Assessment and plan of treatment:	Please continue taking medication as prescribed. Make sure to follow up with OBGYN and PCP.   1

## 2024-12-01 NOTE — DISCHARGE NOTE OB - CARE PROVIDER_API CALL
Jonnathan Thomas  Obstetrics and Gynecology  [address] [place] [address]  Phone: [phone]  Fax: [phone]  Established Patient  Follow Up Time: 1 week

## 2024-12-01 NOTE — PROGRESS NOTE ADULT - ATTENDING COMMENTS
Pt seen and examined.  Doing well  incision appears clean and dry.  Cont on lovenox 70 bid.    Routine postpartum care.    PLEE
Agree with assessment and plan as above.

## 2024-12-01 NOTE — DISCHARGE NOTE OB - MEDICATION SUMMARY - MEDICATIONS TO TAKE
I will START or STAY ON the medications listed below when I get home from the hospital:    ibuprofen 600 mg oral tablet  -- 1 tab(s) by mouth every 6 hours as needed for  moderate pain  -- Indication: For pain    acetaminophen 325 mg oral tablet  -- 3 tab(s) by mouth every 6 hours as needed for  moderate pain  -- Indication: For pain    Lovenox 80 mg/0.8 mL injectable solution  -- 80 milligram(s) subcutaneously 2 times a day  -- Indication: For Deep vein thrombosis (DVT)    ferrous sulfate 325 mg (65 mg elemental iron) oral tablet  -- 1 tab(s) by mouth once a day  -- Indication: For anemia

## 2024-12-01 NOTE — PROGRESS NOTE ADULT - SUBJECTIVE AND OBJECTIVE BOX
COLE CORNEJO is a 29y  now POD#2 p- section at 34w6d gestation, DVT on Lovenox, with di/di twins.     S:    No acute events overnight.   The patient has no complaints.  Pain controlled with current treatment regimen.   She is ambulating without difficulty and tolerating PO.   + flatus/-BM/+ voiding   She endorses appropriate lochia, which is decreasing.   She is breastfeeding without difficulty.   She denies fevers, chills, nausea and vomiting.   She denies lightheadedness, dizziness, palpitations, chest pain and SOB.     O:    T(C): 36.6 (24 @ 04:30), Max: 36.8 (24 @ 08:30)  HR: 73 (24 @ 04:30) (67 - 86)  BP: 103/61 (24 @ 04:30) (103/61 - 106/55)  RR: 16 (24 @ 04:30) (16 - 17)  SpO2: 97% (24 @ 04:30) (97% - 100%)    Gen: NAD, AOx3  CV: RRR, S1/S2 present  Pulm: CTAB  Breast: Nontender, non-engorged   Abdomen:  Soft, non-tender, non-distended, +bowel sounds  Incision: Clean/dry/intact   Uterus:  Fundus firm below umbilicus  VE:  Expectant lochia  Ext:  Non-tender and non-edematous                          7.5    11.94 )-----------( 283      ( 2024 17:10 )             22.1        COLE CORNEJO is a 29y  now POD#2 p- section at 34w6d gestation, DVT on Lovenox, with di/di twins.     S:    No acute events overnight.   The patient has no complaints.  Pain controlled with current treatment regimen.   She is ambulating without difficulty and tolerating PO.   + flatus/-BM/+ voiding   She endorses appropriate lochia, which is decreasing.   She is breastfeeding without difficulty.   She denies fevers, chills, nausea and vomiting.   She denies lightheadedness, dizziness, palpitations, chest pain and SOB.     O:    T(C): 36.6 (24 @ 04:30), Max: 36.8 (24 @ 08:30)  HR: 73 (24 @ 04:30) (67 - 86)  BP: 103/61 (24 @ 04:30) (103/61 - 106/55)  RR: 16 (24 @ 04:30) (16 - 17)  SpO2: 97% (24 @ 04:30) (97% - 100%)    Gen: NAD, AOx3  CV: RRR, S1/S2 present  Pulm: CTAB  Breast: Nontender, non-engorged   Abdomen:  Soft, non-tender, non-distended, +bowel sounds  Incision: Clean/dry/intact steris in place  Uterus:  Fundus firm below umbilicus  VE:  Expectant lochia  Ext:  Non-tender and +2 edema.                          7.5    11.94 )-----------( 283      ( 2024 17:10 )             22.1

## 2024-12-01 NOTE — DISCHARGE NOTE OB - MEDICATION SUMMARY - MEDICATIONS TO STOP TAKING
I will STOP taking the medications listed below when I get home from the hospital:    Bactrim  mg-160 mg oral tablet  -- 1 tab(s) by mouth every 12 hours   -- Avoid prolonged or excessive exposure to direct and/or artificial sunlight while taking this medication.  Finish all this medication unless otherwise directed by prescriber.  Medication should be taken with plenty of water.    Bactrim  mg-160 mg oral tablet  -- 1 tab(s) by mouth every 12 hours   -- Avoid prolonged or excessive exposure to direct and/or artificial sunlight while taking this medication.  Finish all this medication unless otherwise directed by prescriber.  Medication should be taken with plenty of water.    Ecotrin 325 mg oral delayed release tablet  -- 1 tab(s) by mouth once a day  for DVT prophylaxis for 30 days. Do not skip doses.   -- Swallow whole.  Do not crush.  Take with food or milk.    Percocet 5/325 oral tablet  -- 1 tab(s) by mouth every 4 to 6 hours MDD:6  -- Caution federal law prohibits the transfer of this drug to any person other  than the person for whom it was prescribed.  May cause drowsiness.  Alcohol may intensify this effect.  Use care when operating dangerous machinery.  This prescription cannot be refilled.  This product contains acetaminophen.  Do not use  with any other product containing acetaminophen to prevent possible liver damage.  Using more of this medication than prescribed may cause serious breathing problems.    Sprintec 0.25 mg-35 mcg oral tablet  -- 1 tab(s) by mouth once a day

## 2024-12-01 NOTE — DISCHARGE NOTE OB - PLAN OF CARE
1) Please take ibuprofen and/or Tylenol as needed for pain as prescribed.  2) Nothing in the vagina for 6 weeks (including no sex, no tampons, and no douching).  3) Please call your doctor for a follow up your postpartum appointment in 1-2 weeks.  4) Please continue taking vitamins postpartum.   5) Please call the office sooner if you have heavy vaginal bleeding, severe abdominal pain, or fever > 100.4F.  6) You may resume regular daily activity as tolerated Please continue taking medication as prescribed. Make sure to follow up with OBGYN and PCP.

## 2024-12-02 ENCOUNTER — APPOINTMENT (OUTPATIENT)
Dept: OBGYN | Facility: CLINIC | Age: 29
End: 2024-12-02

## 2024-12-02 LAB
HCT VFR BLD CALC: 28.8 % — LOW (ref 34.5–45)
HGB BLD-MCNC: 9.7 G/DL — LOW (ref 11.5–15.5)
MCHC RBC-ENTMCNC: 32.4 PG — SIGNIFICANT CHANGE UP (ref 27–34)
MCHC RBC-ENTMCNC: 33.7 G/DL — SIGNIFICANT CHANGE UP (ref 32–36)
MCV RBC AUTO: 96.3 FL — SIGNIFICANT CHANGE UP (ref 80–100)
PLATELET # BLD AUTO: 400 K/UL — SIGNIFICANT CHANGE UP (ref 150–400)
RBC # BLD: 2.99 M/UL — LOW (ref 3.8–5.2)
RBC # FLD: 13.3 % — SIGNIFICANT CHANGE UP (ref 10.3–14.5)
WBC # BLD: 11.93 K/UL — HIGH (ref 3.8–10.5)
WBC # FLD AUTO: 11.93 K/UL — HIGH (ref 3.8–10.5)

## 2024-12-02 RX ORDER — ENOXAPARIN SODIUM 30 MG/.3ML
80 INJECTION SUBCUTANEOUS
Qty: 30 | Refills: 2
Start: 2024-12-02 | End: 2025-03-01

## 2024-12-02 RX ADMIN — ACETAMINOPHEN 500MG 975 MILLIGRAM(S): 500 TABLET, COATED ORAL at 16:40

## 2024-12-02 RX ADMIN — Medication 600 MILLIGRAM(S): at 13:11

## 2024-12-02 RX ADMIN — Medication 600 MILLIGRAM(S): at 19:11

## 2024-12-02 RX ADMIN — Medication 600 MILLIGRAM(S): at 00:13

## 2024-12-02 RX ADMIN — Medication 600 MILLIGRAM(S): at 05:59

## 2024-12-02 RX ADMIN — ACETAMINOPHEN 500MG 975 MILLIGRAM(S): 500 TABLET, COATED ORAL at 16:06

## 2024-12-02 RX ADMIN — Medication 600 MILLIGRAM(S): at 13:40

## 2024-12-02 RX ADMIN — Medication 325 MILLIGRAM(S): at 13:11

## 2024-12-02 RX ADMIN — Medication 600 MILLIGRAM(S): at 07:02

## 2024-12-02 RX ADMIN — ACETAMINOPHEN 500MG 975 MILLIGRAM(S): 500 TABLET, COATED ORAL at 22:00

## 2024-12-02 RX ADMIN — ENOXAPARIN SODIUM 70 MILLIGRAM(S): 30 INJECTION SUBCUTANEOUS at 19:11

## 2024-12-02 RX ADMIN — ACETAMINOPHEN 500MG 975 MILLIGRAM(S): 500 TABLET, COATED ORAL at 09:58

## 2024-12-02 RX ADMIN — Medication 600 MILLIGRAM(S): at 01:10

## 2024-12-02 RX ADMIN — Medication 600 MILLIGRAM(S): at 02:00

## 2024-12-02 RX ADMIN — ENOXAPARIN SODIUM 70 MILLIGRAM(S): 30 INJECTION SUBCUTANEOUS at 06:00

## 2024-12-02 RX ADMIN — ACETAMINOPHEN 500MG 975 MILLIGRAM(S): 500 TABLET, COATED ORAL at 21:11

## 2024-12-02 RX ADMIN — ACETAMINOPHEN 500MG 975 MILLIGRAM(S): 500 TABLET, COATED ORAL at 10:30

## 2024-12-02 NOTE — PROGRESS NOTE ADULT - ASSESSMENT
COLE CORNEJO is a 28 y/o   now POD#2 p- section at 34w6d gestation, DVT on Lovenox, with di/di twins.     A/P:    -Vital signs stable  -Hgb: 11.2> 7.8>7.5-->AM labs pending   -Voiding, tolerating PO, bowel function nml   -Advance care as tolerated   -Continue routine postpartum and postoperative care and education  - Healthy twins- male and female infants. Male infant, desires/declines**** circumcision  -DVT ppx:  lovenox 70mg BID, re-weigh prior to d/c to redose for therapeutic   -Dispo: Patient to be discharged when meeting all postpartum and postoperative milestones and pending attending approval.    Case discussed with Dr. MARKUS Graff
28yo patient on POD#3 doing well. Hemodynamically stable. Her last Hb was 7.5 postop, will repeat now before discharge. Patient tolerating ambulation, no signs of active bleeding and stable vital signs.    Plan  - routine postop care  - regular diet  - anemia-- tx Slofe, f/u CBC results before discharge  - patient with h/o DVT in pregnancy-- will continue lovenox bid postop
A/P:    -Vital signs stable  -Hgb: 11.2 -> AM labs pending   -Voiding, tolerating PO  -Advance care as tolerated   -Continue routine postpartum and postoperative care and education  -DVT ppx:  lovenox 70mg BID, re-weigh prior to d/c to redose for therapeutic   -Dispo: Anticipate discharge to home pending attending approval.

## 2024-12-02 NOTE — PROGRESS NOTE ADULT - SUBJECTIVE AND OBJECTIVE BOX
28yo patient on POD#3 status post C/S. Doing well. Tolerating diet and ambulation without difficulties. Normal lochia. Postop pain adequately controlled with po meds.     Vital Signs Last 24 Hrs  T(C): 36.4 (02 Dec 2024 00:15), Max: 36.6 (01 Dec 2024 20:00)  T(F): 97.6 (02 Dec 2024 00:15), Max: 97.8 (01 Dec 2024 20:00)  HR: 72 (02 Dec 2024 00:15) (66 - 84)  BP: 114/54 (02 Dec 2024 00:15) (114/54 - 139/74)  BP(mean): --  RR: 16 (02 Dec 2024 00:15) (16 - 18)  SpO2: 98% (02 Dec 2024 00:15) (98% - 100%)    Parameters below as of 02 Dec 2024 00:15  Patient On (Oxygen Delivery Method): room air      Physical exam  Gen AAO x 3  Abd well-contracted uterus, S&D, c/d/i incision  Pelvic normal lochia  Ext no calf tenderness

## 2024-12-03 ENCOUNTER — APPOINTMENT (OUTPATIENT)
Dept: ANTEPARTUM | Facility: CLINIC | Age: 29
End: 2024-12-03

## 2024-12-03 ENCOUNTER — OUTPATIENT (OUTPATIENT)
Dept: OUTPATIENT SERVICES | Facility: HOSPITAL | Age: 29
LOS: 1 days | End: 2024-12-03
Payer: COMMERCIAL

## 2024-12-03 VITALS
WEIGHT: 175.05 LBS | OXYGEN SATURATION: 100 % | DIASTOLIC BLOOD PRESSURE: 83 MMHG | HEIGHT: 66 IN | HEART RATE: 87 BPM | RESPIRATION RATE: 16 BRPM | SYSTOLIC BLOOD PRESSURE: 123 MMHG | TEMPERATURE: 98 F

## 2024-12-03 VITALS
OXYGEN SATURATION: 98 % | HEART RATE: 82 BPM | RESPIRATION RATE: 16 BRPM | TEMPERATURE: 98 F | SYSTOLIC BLOOD PRESSURE: 121 MMHG | DIASTOLIC BLOOD PRESSURE: 62 MMHG

## 2024-12-03 DIAGNOSIS — Z98.890 OTHER SPECIFIED POSTPROCEDURAL STATES: Chronic | ICD-10-CM

## 2024-12-03 DIAGNOSIS — Z98.891 HISTORY OF UTERINE SCAR FROM PREVIOUS SURGERY: Chronic | ICD-10-CM

## 2024-12-03 DIAGNOSIS — I82.409 ACUTE EMBOLISM AND THROMBOSIS OF UNSPECIFIED DEEP VEINS OF UNSPECIFIED LOWER EXTREMITY: ICD-10-CM

## 2024-12-03 LAB
ANION GAP SERPL CALC-SCNC: 16 MMOL/L — SIGNIFICANT CHANGE UP (ref 5–17)
BLD GP AB SCN SERPL QL: NEGATIVE — SIGNIFICANT CHANGE UP
BUN SERPL-MCNC: 9 MG/DL — SIGNIFICANT CHANGE UP (ref 7–23)
CALCIUM SERPL-MCNC: 9.4 MG/DL — SIGNIFICANT CHANGE UP (ref 8.4–10.5)
CHLORIDE SERPL-SCNC: 104 MMOL/L — SIGNIFICANT CHANGE UP (ref 96–108)
CO2 SERPL-SCNC: 20 MMOL/L — LOW (ref 22–31)
CREAT SERPL-MCNC: 0.63 MG/DL — SIGNIFICANT CHANGE UP (ref 0.5–1.3)
EGFR: 122 ML/MIN/1.73M2 — SIGNIFICANT CHANGE UP
GLUCOSE SERPL-MCNC: 92 MG/DL — SIGNIFICANT CHANGE UP (ref 70–99)
POTASSIUM SERPL-MCNC: 4.1 MMOL/L — SIGNIFICANT CHANGE UP (ref 3.5–5.3)
POTASSIUM SERPL-SCNC: 4.1 MMOL/L — SIGNIFICANT CHANGE UP (ref 3.5–5.3)
RH IG SCN BLD-IMP: POSITIVE — SIGNIFICANT CHANGE UP
SODIUM SERPL-SCNC: 140 MMOL/L — SIGNIFICANT CHANGE UP (ref 135–145)
SURGICAL PATHOLOGY STUDY: SIGNIFICANT CHANGE UP

## 2024-12-03 PROCEDURE — 86900 BLOOD TYPING SEROLOGIC ABO: CPT

## 2024-12-03 PROCEDURE — 86901 BLOOD TYPING SEROLOGIC RH(D): CPT

## 2024-12-03 PROCEDURE — 80048 BASIC METABOLIC PNL TOTAL CA: CPT

## 2024-12-03 PROCEDURE — G0463: CPT

## 2024-12-03 PROCEDURE — 86850 RBC ANTIBODY SCREEN: CPT

## 2024-12-03 RX ADMIN — ACETAMINOPHEN 500MG 975 MILLIGRAM(S): 500 TABLET, COATED ORAL at 08:29

## 2024-12-03 RX ADMIN — ENOXAPARIN SODIUM 70 MILLIGRAM(S): 30 INJECTION SUBCUTANEOUS at 06:46

## 2024-12-03 RX ADMIN — ACETAMINOPHEN 500MG 975 MILLIGRAM(S): 500 TABLET, COATED ORAL at 02:39

## 2024-12-03 RX ADMIN — Medication 600 MILLIGRAM(S): at 00:39

## 2024-12-03 RX ADMIN — ACETAMINOPHEN 500MG 975 MILLIGRAM(S): 500 TABLET, COATED ORAL at 03:15

## 2024-12-03 RX ADMIN — Medication 600 MILLIGRAM(S): at 07:20

## 2024-12-03 RX ADMIN — Medication 600 MILLIGRAM(S): at 01:15

## 2024-12-03 RX ADMIN — Medication 600 MILLIGRAM(S): at 06:46

## 2024-12-03 NOTE — H&P PST ADULT - NSICDXPASTMEDICALHX_GEN_ALL_CORE_FT
PAST MEDICAL HISTORY:  2019 novel coronavirus disease (COVID-19)     Deep vein thrombosis (DVT)     Twin pregnancy      PAST MEDICAL HISTORY:  2019 novel coronavirus disease (COVID-19)     Deep vein thrombosis (DVT)     Edema of both lower extremities     Lactating mother     Twin pregnancy

## 2024-12-03 NOTE — H&P PST ADULT - ATTENDING COMMENTS
Pt with L iliac vein DVT in a setting of recent pregnancy  L leg swelling and pain while on A/C  will plan for L iliac vein thrombectomy to decrease risk of PTS

## 2024-12-03 NOTE — H&P PST ADULT - HISTORY OF PRESENT ILLNESS
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 femoral vein no propagation distally  ,  , breast feeding   Discussed the findings of ultrasound with the patient and her  explained that this does occur occasionally with pregnancy likely secondary to compression from her gravid uterus on the left iliac venous system  Reached out to the patient's OB/GYN to relay my findings  Reached out to danie to assess and to give recommendations for anticoagulation  Will start weight-based Lovenox therapy 1 mg/kg twice a day  Patient will need close follow-up and monitoring with hematology she has an appointment set up I confirmed this  Will likely need monitoring with factor Xa levels  Follow-up approximately 2 to 3 months postpartum to repeat duplex and ensure that the DVT is resolved. 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 ( twins in NICU 5 plus Pounds ) currently breast feeding ( discharged today 12/3/2024) planned for Left Iliac Vein thrombectomy, IVUS, central, possible iliac vein stent on 2024 w/ Dr. Aguero.       *****Discussed with Dr. 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 Aguero.       *****Discussed with Dr. aguero via teams, Last dose Lovenox  night, Hold Lovenox am of the procedure  ***Patient has Heme appointment on  after the surgery

## 2024-12-03 NOTE — H&P PST ADULT - OTHER CARE PROVIDERS
hematology Dr. Alejandro helton new visit 12/11, Dr. Yaya TRUONG hematology Dr. Alejandro helton new visit 12/11,   Dr. Yaya TRUONG, Dr. Salinas vascular

## 2024-12-03 NOTE — H&P PST ADULT - SKIN
After Visit Summary   3/20/2017    Gaby Baptiste    MRN: [id number]           Diagnoses this Visit     [facility]    -  Primary       Allergies     Penicillins       Your Vital Signs Were     BP Pulse Temp(Src) Resp Smoking Statu
warm and dry

## 2024-12-03 NOTE — PROGRESS NOTE ADULT - SUBJECTIVE AND OBJECTIVE BOX
Section, POD#4    Pt is now POD#4 S/P  Section due to twins/pprom                She is ambulating without difficulty, tolerating a reg diet, and denies N/V.  No C/O dizziness, no IWONA.  +Voiding without difficulty.      On exam, pt appears well.  VSS, afebrile.    Vital Signs Last 24 Hrs  T(C): 36.4 (03 Dec 2024 08:00), Max: 36.6 (02 Dec 2024 16:44)  T(F): 97.6 (03 Dec 2024 08:00), Max: 97.8 (02 Dec 2024 16:44)  HR: 82 (03 Dec 2024 08:00) (64 - 82)  BP: 121/62 (03 Dec 2024 08:00) (111/66 - 121/62)  BP(mean): --  RR: 16 (03 Dec 2024 08:00) (16 - 16)  SpO2: 98% (03 Dec 2024 08:00) (98% - 100%)    Parameters below as of 03 Dec 2024 08:00  Patient On (Oxygen Delivery Method): room air        Breasts soft, nontender, nonengorged.  Abdomen: Soft, nontender, nondistended , firm uterine fundus.  Incision clean, dry, intact with staples.  Pelvic: Normal lochia noted  Ext: No DVT tenderness, normal pulses, edema WNL for C/S POD#1.    LABS:                        9.7    11.93 )-----------( 400      ( 02 Dec 2024 09:55 )             28.8                 Allergies    No Known Allergies    Intolerances      MEDICATIONS  (STANDING):  acetaminophen     Tablet .. 975 milliGRAM(s) Oral <User Schedule>  diphtheria/tetanus/pertussis (acellular) Vaccine (Adacel) 0.5 milliLiter(s) IntraMuscular once  enoxaparin Injectable 70 milliGRAM(s) SubCutaneous every 12 hours  ferrous    sulfate 325 milliGRAM(s) Oral daily  ibuprofen  Tablet. 600 milliGRAM(s) Oral every 6 hours  influenza   Vaccine 0.5 milliLiter(s) IntraMuscular once  lactated ringers. 1000 milliLiter(s) (125 mL/Hr) IV Continuous <Continuous>  lactated ringers. 1000 milliLiter(s) (200 mL/Hr) IV Continuous <Continuous>  oxytocin Infusion 42 milliUNIT(s)/Min (42 mL/Hr) IV Continuous <Continuous>  senna 2 Tablet(s) Oral at bedtime    MEDICATIONS  (PRN):  diphenhydrAMINE 25 milliGRAM(s) Oral every 6 hours PRN Pruritus  lanolin Ointment 1 Application(s) Topical every 6 hours PRN Sore Nipples  magnesium hydroxide Suspension 30 milliLiter(s) Oral two times a day PRN Constipation  oxyCODONE    IR 5 milliGRAM(s) Oral every 3 hours PRN Moderate to Severe Pain (4-10)  oxyCODONE    IR 5 milliGRAM(s) Oral once PRN Moderate to Severe Pain (4-10)  simethicone 80 milliGRAM(s) Chew every 4 hours PRN Gas      POD#4 S/P       stable.  Pt recovering well.  Signs and symptoms normal /  abnormal post op courses reviewed.  Pt to continue lovenox 80 mg bid. follow up 2 weeks

## 2024-12-03 NOTE — H&P PST ADULT - PROBLEM SELECTOR PLAN 1
Left Iliac vein thrombectomy , IVUS, Central, possible iliac vein stent  CBC on sunrise  BMP, T&S send  Preprocedure  surgical scrub instructions discussed   last dose Lovenox 12/4 night as per Dr. Aguero via teams

## 2024-12-03 NOTE — H&P PST ADULT - WEIGHT IN KG
Please follow-up with ENT for further management, your ear does not look overly infected but we will start a course of antibiotic to cover any early infection given your worsening symptoms.  
79.4

## 2024-12-04 ENCOUNTER — NON-APPOINTMENT (OUTPATIENT)
Age: 29
End: 2024-12-04

## 2024-12-05 ENCOUNTER — APPOINTMENT (OUTPATIENT)
Dept: VASCULAR SURGERY | Facility: HOSPITAL | Age: 29
End: 2024-12-05

## 2024-12-05 ENCOUNTER — TRANSCRIPTION ENCOUNTER (OUTPATIENT)
Age: 29
End: 2024-12-05

## 2024-12-05 ENCOUNTER — OUTPATIENT (OUTPATIENT)
Dept: OUTPATIENT SERVICES | Facility: HOSPITAL | Age: 29
LOS: 1 days | End: 2024-12-05
Payer: COMMERCIAL

## 2024-12-05 VITALS
SYSTOLIC BLOOD PRESSURE: 105 MMHG | TEMPERATURE: 98 F | OXYGEN SATURATION: 97 % | HEART RATE: 79 BPM | DIASTOLIC BLOOD PRESSURE: 62 MMHG | RESPIRATION RATE: 16 BRPM

## 2024-12-05 VITALS
HEIGHT: 66 IN | SYSTOLIC BLOOD PRESSURE: 116 MMHG | WEIGHT: 178.57 LBS | HEART RATE: 80 BPM | OXYGEN SATURATION: 99 % | DIASTOLIC BLOOD PRESSURE: 76 MMHG | RESPIRATION RATE: 16 BRPM | TEMPERATURE: 98 F

## 2024-12-05 DIAGNOSIS — Z98.891 HISTORY OF UTERINE SCAR FROM PREVIOUS SURGERY: Chronic | ICD-10-CM

## 2024-12-05 DIAGNOSIS — Z98.890 OTHER SPECIFIED POSTPROCEDURAL STATES: Chronic | ICD-10-CM

## 2024-12-05 DIAGNOSIS — I82.501 CHRONIC EMBOLISM AND THROMBOSIS OF UNSPECIFIED DEEP VEINS OF RIGHT LOWER EXTREMITY: ICD-10-CM

## 2024-12-05 PROBLEM — U07.1 COVID-19: Chronic | Status: ACTIVE | Noted: 2024-12-03

## 2024-12-05 PROBLEM — I82.409 ACUTE EMBOLISM AND THROMBOSIS OF UNSPECIFIED DEEP VEINS OF UNSPECIFIED LOWER EXTREMITY: Chronic | Status: ACTIVE | Noted: 2024-11-29

## 2024-12-05 PROCEDURE — 37252 INTRVASC US NONCORONARY 1ST: CPT

## 2024-12-05 PROCEDURE — C1753: CPT

## 2024-12-05 PROCEDURE — C1876: CPT

## 2024-12-05 PROCEDURE — 76000 FLUOROSCOPY <1 HR PHYS/QHP: CPT

## 2024-12-05 PROCEDURE — C1769: CPT

## 2024-12-05 PROCEDURE — 36012 PLACE CATHETER IN VEIN: CPT | Mod: LT

## 2024-12-05 PROCEDURE — 37187 VENOUS MECH THROMBECTOMY: CPT | Mod: LT

## 2024-12-05 PROCEDURE — C1887: CPT

## 2024-12-05 PROCEDURE — 36010 PLACE CATHETER IN VEIN: CPT | Mod: LT

## 2024-12-05 PROCEDURE — 37238 OPEN/PERQ PLACE STENT SAME: CPT | Mod: LT

## 2024-12-05 PROCEDURE — 37239 OPEN/PERQ PLACE STENT EA ADD: CPT | Mod: LT

## 2024-12-05 PROCEDURE — 86850 RBC ANTIBODY SCREEN: CPT

## 2024-12-05 PROCEDURE — 37191 INS ENDOVAS VENA CAVA FILTR: CPT

## 2024-12-05 PROCEDURE — C1725: CPT

## 2024-12-05 PROCEDURE — C1894: CPT

## 2024-12-05 PROCEDURE — 75820 VEIN X-RAY ARM/LEG: CPT | Mod: XU,LT

## 2024-12-05 PROCEDURE — 86901 BLOOD TYPING SEROLOGIC RH(D): CPT

## 2024-12-05 PROCEDURE — 86900 BLOOD TYPING SEROLOGIC ABO: CPT

## 2024-12-05 DEVICE — IMPLANTABLE DEVICE: Type: IMPLANTABLE DEVICE | Site: LEFT | Status: FUNCTIONAL

## 2024-12-05 DEVICE — SHEATH INTRODUCER TERUMO PINNACLE PERIPHERAL 6FR X 10CM: Type: IMPLANTABLE DEVICE | Site: LEFT | Status: FUNCTIONAL

## 2024-12-05 DEVICE — GUIDEWIRE RADIFOCUS GLIDEWIRE STANDARD ANGLED TIP 0.035" X 260CM: Type: IMPLANTABLE DEVICE | Site: LEFT | Status: FUNCTIONAL

## 2024-12-05 DEVICE — BLLN PTA ATLAS 16X4X75: Type: IMPLANTABLE DEVICE | Site: LEFT | Status: FUNCTIONAL

## 2024-12-05 DEVICE — CATH ANGIO GLIDECATH ANGLE 5FR X 100CM: Type: IMPLANTABLE DEVICE | Site: LEFT | Status: FUNCTIONAL

## 2024-12-05 DEVICE — GUIDEWIRE AMPLATZ SUPER-STIFF SHORT TAPER .035" X 260CM: Type: IMPLANTABLE DEVICE | Site: LEFT | Status: FUNCTIONAL

## 2024-12-05 DEVICE — SHEATH INTRODUCER TERUMO PINNACLE PERIPHERAL 11FR X 10CM: Type: IMPLANTABLE DEVICE | Site: LEFT | Status: FUNCTIONAL

## 2024-12-05 DEVICE — INTRODUCER SET MICROPUNCTURE ACCESS 21G X 7CM: Type: IMPLANTABLE DEVICE | Site: LEFT | Status: FUNCTIONAL

## 2024-12-05 DEVICE — WIRE GUIDE COMMAND 300CM: Type: IMPLANTABLE DEVICE | Site: LEFT | Status: FUNCTIONAL

## 2024-12-05 DEVICE — CATH OMNI FLSH 0.035IN 5FRX65: Type: IMPLANTABLE DEVICE | Site: LEFT | Status: FUNCTIONAL

## 2024-12-05 DEVICE — SHEATH CLOTTRIEVER: Type: IMPLANTABLE DEVICE | Site: LEFT | Status: FUNCTIONAL

## 2024-12-05 DEVICE — CATH IVUS T/A PU .035CM: Type: IMPLANTABLE DEVICE | Site: LEFT | Status: FUNCTIONAL

## 2024-12-05 RX ORDER — HYDROMORPHONE HYDROCHLORIDE 2 MG/1
0.5 TABLET ORAL
Refills: 0 | Status: DISCONTINUED | OUTPATIENT
Start: 2024-12-05 | End: 2024-12-05

## 2024-12-05 RX ORDER — SODIUM CHLORIDE 9 MG/ML
3 INJECTION, SOLUTION INTRAMUSCULAR; INTRAVENOUS; SUBCUTANEOUS EVERY 8 HOURS
Refills: 0 | Status: DISCONTINUED | OUTPATIENT
Start: 2024-12-05 | End: 2024-12-05

## 2024-12-05 RX ORDER — ONDANSETRON HYDROCHLORIDE 4 MG/1
4 TABLET, FILM COATED ORAL ONCE
Refills: 0 | Status: DISCONTINUED | OUTPATIENT
Start: 2024-12-05 | End: 2024-12-05

## 2024-12-05 RX ORDER — CEFAZOLIN SODIUM 10 G
2000 VIAL (EA) INJECTION ONCE
Refills: 0 | Status: COMPLETED | OUTPATIENT
Start: 2024-12-05 | End: 2024-12-05

## 2024-12-05 RX ORDER — LIDOCAINE HCL 20 MG/ML
0.2 VIAL (ML) INJECTION ONCE
Refills: 0 | Status: DISCONTINUED | OUTPATIENT
Start: 2024-12-05 | End: 2024-12-05

## 2024-12-05 RX ORDER — .BETA.-CAROTENE, SODIUM ACETATE, ASCORBIC ACID, CHOLECALCIFEROL, .ALPHA.-TOCOPHEROL ACETATE, DL-, THIAMINE MONONITRATE, RIBOFLAVIN, NIACINAMIDE, PYRIDOXINE HYDROCHLORIDE, FOLIC ACID, CYANOCOBALAMIN, CALCIUM CARBONATE, FERROUS FUMARATE, ZINC OXIDE AND CUPRIC OXIDE 2000; 2000; 120; 400; 22; 1.84; 3; 20; 10; 1; 12; 200; 27; 25; 2 [IU]/1; [IU]/1; MG/1; [IU]/1; MG/1; MG/1; MG/1; MG/1; MG/1; MG/1; UG/1; MG/1; MG/1; MG/1; MG/1
0 TABLET ORAL
Refills: 0 | DISCHARGE

## 2024-12-05 RX ORDER — FENTANYL 12 UG/H
25 PATCH, EXTENDED RELEASE TRANSDERMAL
Refills: 0 | Status: DISCONTINUED | OUTPATIENT
Start: 2024-12-05 | End: 2024-12-05

## 2024-12-05 RX ORDER — CHLORHEXIDINE GLUCONATE 1.2 MG/ML
1 RINSE ORAL ONCE
Refills: 0 | Status: DISCONTINUED | OUTPATIENT
Start: 2024-12-05 | End: 2024-12-05

## 2024-12-05 RX ADMIN — FENTANYL 25 MICROGRAM(S): 12 PATCH, EXTENDED RELEASE TRANSDERMAL at 14:15

## 2024-12-05 RX ADMIN — FENTANYL 25 MICROGRAM(S): 12 PATCH, EXTENDED RELEASE TRANSDERMAL at 14:00

## 2024-12-05 NOTE — PRE-ANESTHESIA EVALUATION ADULT - NSANTHPMHFT_GEN_ALL_CORE
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

## 2024-12-05 NOTE — ASU DISCHARGE PLAN (ADULT/PEDIATRIC) - FINANCIAL ASSISTANCE
[facility] provides services at a reduced cost to those who are determined to be eligible through [facility]’s financial assistance program. Information regarding [facility]’s financial assistance program can be found by going to [email or web address] or by calling [phone].

## 2024-12-05 NOTE — ASU DISCHARGE PLAN (ADULT/PEDIATRIC) - NS MD DC FALL RISK RISK
For information on Fall & Injury Prevention, visit: [email or web address] OR  [email or web address] OR  https://www.cdc.gov/steadi/patient.html

## 2024-12-05 NOTE — CHART NOTE - NSCHARTNOTEFT_GEN_A_CORE
Post Operative Note  Patient: COLE CORNEJO 29y (1995) Female   MRN: [id number]  Location: [facility] PACU 14  Visit: 12-05-24 Outpatient  Date: 12-05-24 @ 14:21    Procedure: s/p left ileofemoral venogram, left iliac vein mechanical thrombectomy, balloon angioplasty, and stent placement    Subjective:   Patient seen and examined at bedside postoperatively. Hemodynamically stable, pain well controlled. Denies CP, SOB, N, V, fevers, chills, LE weakness, LE coldness, LE pins or needles.        Objective:  Vitals: T(F): 98.2 (12-05-24 @ 11:20), Max: 98.4 (12-05-24 @ 06:00)  HR: 73 (12-05-24 @ 14:00)  BP: 104/67 (12-05-24 @ 14:00) (104/67 - 136/74)  RR: 16 (12-05-24 @ 14:00)  SpO2: 96% (12-05-24 @ 14:00)  Vent Settings:     In:   12-05-24 @ 07:01  -  12-05-24 @ 14:21  --------------------------------------------------------  IN: 0 mL      IV Fluids:     Out:   12-05-24 @ 07:01  -  12-05-24 @ 14:21  --------------------------------------------------------  OUT: 800 mL      EBL:     Voided Urine:   12-05-24 @ 07:01  -  12-05-24 @ 14:21  --------------------------------------------------------  OUT: 800 mL      Blanco Catheter: yes no   Drains:   SEBASTIAN:    ,   Chest Tube:      NG Tube:         Physical Examination:  **************  General: NAD, resting comfortably in bed  HEENT: Normocephalic atraumatic  Respiratory: Nonlabored respirations, normal CW expansion.  Cardio: WWP  Vascular: R groin access site soft, no palpable collections. Gauze and tegaderm c/d/i  LEs warm and well perfused throughout  LE motor: intact leg extension, knee flexion, dorsiflexion, plantar flexion, wiggles toes to command  LE sensation intact to light touch bilaterally  L DP and PT palpable / dopplerable  R DP and PT palpable / dopplerable        Imaging:  No post-op imaging studies    Assessment:  29yFemale patient sp left ileofemoral venogram, left iliac vein mechanical thrombectomy, balloon angioplasty, and stent placement 12/05.      Plan:  - Restart home lovenox  - Pain control PRN  - Diet: reg  -     Vascular Surgery  [id number]  Date/Time: 12-05-24 @ 14:21 Post Operative Note  Patient: COLE CORNEJO 29y (1995) Female   MRN: [id number]  Location: [facility] PACU 14  Visit: 12-05-24 Outpatient  Date: 12-05-24 @ 14:21    Procedure: s/p left ileofemoral venogram, left iliac vein mechanical thrombectomy, balloon angioplasty, and stent placement    Subjective:   Patient seen and examined at bedside postoperatively. Hemodynamically stable, denies pain. Denies CP, SOB, N, V, fevers, chills, LE weakness, LE coldness, LE pins or needles.    Objective:  Vitals: T(F): 98.2 (12-05-24 @ 11:20), Max: 98.4 (12-05-24 @ 06:00)  HR: 73 (12-05-24 @ 14:00)  BP: 104/67 (12-05-24 @ 14:00) (104/67 - 136/74)  RR: 16 (12-05-24 @ 14:00)  SpO2: 96% (12-05-24 @ 14:00)  Vent Settings:     In:   12-05-24 @ 07:01  -  12-05-24 @ 14:21  --------------------------------------------------------  IN: 0 mL      IV Fluids:     Out:   12-05-24 @ 07:01  -  12-05-24 @ 14:21  --------------------------------------------------------  OUT: 800 mL      EBL:     Voided Urine:   12-05-24 @ 07:01  -  12-05-24 @ 14:21  --------------------------------------------------------  OUT: 800 mL      Blanco Catheter: yes no   Drains:   SEBASTIAN:    ,   Chest Tube:      NG Tube:         Physical Examination:    General: NAD, resting comfortably in chair  HEENT: Normocephalic atraumatic  Respiratory: Nonlabored respirations, normal CW expansion.  Cardio: WWP  Vascular: R groin access site soft, no palpable collections, opsite dressing c/d/i. L leg soft, no palpable collections, in ace wrap c/d/i without strikethrough. LEs warm and well perfused throughout            Imaging:  No post-op imaging studies    Assessment:  29yFemale patient sp left ileofemoral venogram, left iliac vein mechanical thrombectomy, balloon angioplasty, and stent placement 12/05.      Plan:  - Restart home lovenox  - Pain control PRN  - Diet: reg  - Stable for discharge to home    Vascular Surgery  [id number]  Date/Time: 12-05-24 @ 14:21

## 2024-12-05 NOTE — ASU PATIENT PROFILE, ADULT - NSICDXPASTMEDICALHX_GEN_ALL_CORE_FT
PAST MEDICAL HISTORY:  2019 novel coronavirus disease (COVID-19)     Deep vein thrombosis (DVT)     Edema of both lower extremities     Lactating mother     Twin pregnancy

## 2024-12-05 NOTE — ASU DISCHARGE PLAN (ADULT/PEDIATRIC) - CARE PROVIDER_API CALL
Tova Aguero  Vascular Surgery  [address], [address]  [place] [address]  Phone: [phone]  Fax: [phone]  Follow Up Time: 2 weeks

## 2024-12-05 NOTE — BRIEF OPERATIVE NOTE - OPERATION/FINDINGS
Left leg venogram  Left popliteal vein access  Right GSV access by SFJ junction for embolic protection device (Inari discs)  Left iliac vein mechanical thrombectomy with Inari ClotTriever Bold  Balloon angioplasty with [id number] Conquest balloon  IVUS  Left iliac vein stent placement [id number] Venovo bare metal self-expanding stent, extended peripherally to left femoral vein with [id number] Venovo stent (to ensure adequate inflow into stent)  Post-dilation with [id number] and [id number] Conquest balloons  Completion venography with good result  Manual pressure for hemostasis, skin incisions closed with 4-0 Monocryl suture

## 2024-12-05 NOTE — ASU DISCHARGE PLAN (ADULT/PEDIATRIC) - ASU DC SPECIAL INSTRUCTIONSFT
WOUND CARE: Remove outer plastic dressing if you have one 48hrs from surgery.      Pain control: Please take over the counter medications    Home medications: Please restart all home medicaions     DIET: No change    NOTIFY YOUR SURGEON IF: You have any bleeding that does not stop, any pus draining from your wound, any fever (over 100.4 F) or chills, persistent nausea/vomiting with inability to tolerate food or liquids, persistent diarrhea, or if your pain is not controlled on your discharge pain medications.    FOLLOW-UP:  - Please call to make a follow-up appointment within 1-2 weeks of discharge  with Dr. Aguero  - Please follow up with your primary care physician in 1-2 weeks regarding your hospitalization

## 2024-12-06 PROBLEM — R60.0 LOCALIZED EDEMA: Chronic | Status: ACTIVE | Noted: 2024-12-03

## 2024-12-07 DIAGNOSIS — O30.043 TWIN PREGNANCY, DICHORIONIC/DIAMNIOTIC, THIRD TRIMESTER: ICD-10-CM

## 2024-12-07 DIAGNOSIS — Z3A.34 34 WEEKS GESTATION OF PREGNANCY: ICD-10-CM

## 2024-12-07 DIAGNOSIS — O22.33 DEEP PHLEBOTHROMBOSIS IN PREGNANCY, THIRD TRIMESTER: ICD-10-CM

## 2024-12-07 DIAGNOSIS — Z79.01 LONG TERM (CURRENT) USE OF ANTICOAGULANTS: ICD-10-CM

## 2024-12-07 DIAGNOSIS — I82.402 ACUTE EMBOLISM AND THROMBOSIS OF UNSPECIFIED DEEP VEINS OF LEFT LOWER EXTREMITY: ICD-10-CM

## 2024-12-07 PROBLEM — O30.009 TWIN PREGNANCY, UNSPECIFIED NUMBER OF PLACENTA AND UNSPECIFIED NUMBER OF AMNIOTIC SACS, UNSPECIFIED TRIMESTER: Chronic | Status: ACTIVE | Noted: 2024-12-03

## 2024-12-10 ENCOUNTER — APPOINTMENT (OUTPATIENT)
Dept: ANTEPARTUM | Facility: CLINIC | Age: 29
End: 2024-12-10

## 2024-12-11 ENCOUNTER — NON-APPOINTMENT (OUTPATIENT)
Age: 29
End: 2024-12-11

## 2024-12-12 ENCOUNTER — APPOINTMENT (OUTPATIENT)
Age: 29
End: 2024-12-12
Payer: COMMERCIAL

## 2024-12-12 PROCEDURE — S9443: CPT | Mod: 95

## 2024-12-17 ENCOUNTER — APPOINTMENT (OUTPATIENT)
Dept: ANTEPARTUM | Facility: CLINIC | Age: 29
End: 2024-12-17

## 2024-12-17 RX ORDER — ENOXAPARIN SODIUM 60 MG/.6ML
60 INJECTION, SOLUTION SUBCUTANEOUS
Qty: 60 | Refills: 1 | Status: ACTIVE | COMMUNITY
Start: 2024-12-17 | End: 1900-01-01

## 2024-12-18 ENCOUNTER — APPOINTMENT (OUTPATIENT)
Dept: VASCULAR SURGERY | Facility: CLINIC | Age: 29
End: 2024-12-18
Payer: COMMERCIAL

## 2024-12-18 VITALS
WEIGHT: 155 LBS | HEIGHT: 66 IN | DIASTOLIC BLOOD PRESSURE: 74 MMHG | TEMPERATURE: 98 F | HEART RATE: 61 BPM | BODY MASS INDEX: 24.91 KG/M2 | SYSTOLIC BLOOD PRESSURE: 113 MMHG

## 2024-12-18 DIAGNOSIS — I87.1 COMPRESSION OF VEIN: ICD-10-CM

## 2024-12-18 PROCEDURE — 99213 OFFICE O/P EST LOW 20 MIN: CPT

## 2024-12-19 ENCOUNTER — APPOINTMENT (OUTPATIENT)
Dept: OBGYN | Facility: CLINIC | Age: 29
End: 2024-12-19
Payer: COMMERCIAL

## 2024-12-19 VITALS
DIASTOLIC BLOOD PRESSURE: 68 MMHG | BODY MASS INDEX: 25.07 KG/M2 | WEIGHT: 156 LBS | HEIGHT: 66 IN | SYSTOLIC BLOOD PRESSURE: 114 MMHG

## 2024-12-19 DIAGNOSIS — Z30.011 ENCOUNTER FOR INITIAL PRESCRIPTION OF CONTRACEPTIVE PILLS: ICD-10-CM

## 2024-12-19 PROBLEM — I87.1 MAY-THURNER SYNDROME: Status: ACTIVE | Noted: 2024-12-19

## 2024-12-19 PROCEDURE — 0503F POSTPARTUM CARE VISIT: CPT

## 2024-12-19 RX ORDER — NORETHINDRONE 0.35 MG/1
0.35 TABLET ORAL DAILY
Qty: 30 | Refills: 6 | Status: ACTIVE | COMMUNITY
Start: 2024-12-19 | End: 1900-01-01

## 2024-12-24 ENCOUNTER — NON-APPOINTMENT (OUTPATIENT)
Age: 29
End: 2024-12-24

## 2024-12-24 ENCOUNTER — APPOINTMENT (OUTPATIENT)
Dept: ANTEPARTUM | Facility: CLINIC | Age: 29
End: 2024-12-24

## 2024-12-31 ENCOUNTER — APPOINTMENT (OUTPATIENT)
Dept: OBGYN | Facility: HOSPITAL | Age: 29
End: 2024-12-31

## 2024-12-31 ENCOUNTER — APPOINTMENT (OUTPATIENT)
Dept: ANTEPARTUM | Facility: CLINIC | Age: 29
End: 2024-12-31

## 2025-01-30 ENCOUNTER — TRANSCRIPTION ENCOUNTER (OUTPATIENT)
Age: 30
End: 2025-01-30

## 2025-02-13 ENCOUNTER — TRANSCRIPTION ENCOUNTER (OUTPATIENT)
Age: 30
End: 2025-02-13

## 2025-03-06 ENCOUNTER — APPOINTMENT (OUTPATIENT)
Dept: OBGYN | Facility: CLINIC | Age: 30
End: 2025-03-06

## 2025-03-06 ENCOUNTER — NON-APPOINTMENT (OUTPATIENT)
Age: 30
End: 2025-03-06

## 2025-03-06 VITALS
HEIGHT: 66 IN | DIASTOLIC BLOOD PRESSURE: 70 MMHG | SYSTOLIC BLOOD PRESSURE: 100 MMHG | BODY MASS INDEX: 23.95 KG/M2 | WEIGHT: 149 LBS

## 2025-03-06 DIAGNOSIS — Z01.419 ENCOUNTER FOR GYNECOLOGICAL EXAMINATION (GENERAL) (ROUTINE) W/OUT ABNORMAL FINDINGS: ICD-10-CM

## 2025-03-06 PROCEDURE — 99395 PREV VISIT EST AGE 18-39: CPT

## 2025-03-10 LAB
CYTOLOGY CVX/VAG DOC THIN PREP: NORMAL
HPV HIGH+LOW RISK DNA PNL CVX: NOT DETECTED

## 2025-04-07 ENCOUNTER — TRANSCRIPTION ENCOUNTER (OUTPATIENT)
Age: 30
End: 2025-04-07

## 2025-05-23 ENCOUNTER — APPOINTMENT (OUTPATIENT)
Dept: FAMILY MEDICINE | Facility: CLINIC | Age: 30
End: 2025-05-23
Payer: COMMERCIAL

## 2025-05-23 ENCOUNTER — NON-APPOINTMENT (OUTPATIENT)
Age: 30
End: 2025-05-23

## 2025-05-23 VITALS
DIASTOLIC BLOOD PRESSURE: 74 MMHG | HEART RATE: 62 BPM | BODY MASS INDEX: 23.14 KG/M2 | HEIGHT: 66 IN | TEMPERATURE: 97.3 F | SYSTOLIC BLOOD PRESSURE: 124 MMHG | OXYGEN SATURATION: 99 % | WEIGHT: 144 LBS

## 2025-05-23 DIAGNOSIS — L74.0 MILIARIA RUBRA: ICD-10-CM

## 2025-05-23 PROCEDURE — 99213 OFFICE O/P EST LOW 20 MIN: CPT

## 2025-05-23 RX ORDER — TRIAMCINOLONE ACETONIDE 1 MG/G
0.1 CREAM TOPICAL TWICE DAILY
Qty: 1 | Refills: 0 | Status: ACTIVE | COMMUNITY
Start: 2025-05-23 | End: 1900-01-01

## 2025-06-12 ENCOUNTER — APPOINTMENT (OUTPATIENT)
Dept: FAMILY MEDICINE | Facility: CLINIC | Age: 30
End: 2025-06-12
Payer: COMMERCIAL

## 2025-06-12 VITALS
OXYGEN SATURATION: 97 % | BODY MASS INDEX: 23.3 KG/M2 | HEART RATE: 80 BPM | WEIGHT: 145 LBS | DIASTOLIC BLOOD PRESSURE: 66 MMHG | SYSTOLIC BLOOD PRESSURE: 110 MMHG | TEMPERATURE: 97 F | HEIGHT: 66 IN

## 2025-06-12 PROCEDURE — 99395 PREV VISIT EST AGE 18-39: CPT

## 2025-06-21 LAB
ALBUMIN SERPL ELPH-MCNC: 5.1 G/DL
ALP BLD-CCNC: 63 U/L
ALT SERPL-CCNC: 17 U/L
ANION GAP SERPL CALC-SCNC: 13 MMOL/L
APPEARANCE: CLEAR
AST SERPL-CCNC: 21 U/L
BACTERIA: NEGATIVE /HPF
BASOPHILS # BLD AUTO: 0.09 K/UL
BASOPHILS NFR BLD AUTO: 1.4 %
BILIRUB SERPL-MCNC: 0.8 MG/DL
BILIRUBIN URINE: NEGATIVE
BLOOD URINE: NEGATIVE
BUN SERPL-MCNC: 12 MG/DL
CALCIUM SERPL-MCNC: 10.6 MG/DL
CAST: 0 /LPF
CHLORIDE SERPL-SCNC: 104 MMOL/L
CHOLEST SERPL-MCNC: 227 MG/DL
CO2 SERPL-SCNC: 26 MMOL/L
COLOR: YELLOW
CREAT SERPL-MCNC: 0.84 MG/DL
EGFRCR SERPLBLD CKD-EPI 2021: 95 ML/MIN/1.73M2
EOSINOPHIL # BLD AUTO: 0.2 K/UL
EOSINOPHIL NFR BLD AUTO: 3.1 %
EPITHELIAL CELLS: 5 /HPF
ESTIMATED AVERAGE GLUCOSE: 91 MG/DL
GLUCOSE QUALITATIVE U: NEGATIVE MG/DL
GLUCOSE SERPL-MCNC: 95 MG/DL
HBA1C MFR BLD HPLC: 4.8 %
HCT VFR BLD CALC: 44.2 %
HDLC SERPL-MCNC: 71 MG/DL
HGB BLD-MCNC: 14.8 G/DL
IMM GRANULOCYTES NFR BLD AUTO: 0.3 %
KETONES URINE: NEGATIVE MG/DL
LDLC SERPL-MCNC: 144 MG/DL
LEUKOCYTE ESTERASE URINE: ABNORMAL
LYMPHOCYTES # BLD AUTO: 2.61 K/UL
LYMPHOCYTES NFR BLD AUTO: 40.2 %
MAN DIFF?: NORMAL
MCHC RBC-ENTMCNC: 31.5 PG
MCHC RBC-ENTMCNC: 33.5 G/DL
MCV RBC AUTO: 94 FL
MICROSCOPIC-UA: NORMAL
MONOCYTES # BLD AUTO: 0.6 K/UL
MONOCYTES NFR BLD AUTO: 9.2 %
NEUTROPHILS # BLD AUTO: 2.98 K/UL
NEUTROPHILS NFR BLD AUTO: 45.8 %
NITRITE URINE: NEGATIVE
NONHDLC SERPL-MCNC: 156 MG/DL
PH URINE: 7.5
PLATELET # BLD AUTO: 241 K/UL
POTASSIUM SERPL-SCNC: 5.1 MMOL/L
PROT SERPL-MCNC: 7.7 G/DL
PROTEIN URINE: NEGATIVE MG/DL
RBC # BLD: 4.7 M/UL
RBC # FLD: 12.5 %
RED BLOOD CELLS URINE: 0 /HPF
SODIUM SERPL-SCNC: 144 MMOL/L
SPECIFIC GRAVITY URINE: 1.01
T4 FREE SERPL-MCNC: 1.5 NG/DL
TRIGL SERPL-MCNC: 69 MG/DL
TSH SERPL-ACNC: 1.97 UIU/ML
UROBILINOGEN URINE: 0.2 MG/DL
WBC # FLD AUTO: 6.5 K/UL
WHITE BLOOD CELLS URINE: 1 /HPF

## (undated) DEVICE — Device

## (undated) DEVICE — SOL IRR POUR H2O 250ML

## (undated) DEVICE — SOL INJ NS 0.9% 500ML 1-PORT

## (undated) DEVICE — NDL COUNTER FOAM AND MAGNET 40-70

## (undated) DEVICE — NDL ENTRY PERC MCKNIGHT 18G

## (undated) DEVICE — DRSG OPSITE 13.75 X 4"

## (undated) DEVICE — GLV 6.5 PROTEXIS (WHITE)

## (undated) DEVICE — BLADE SCALPEL SAFETYLOCK #10

## (undated) DEVICE — SUCTION YANKAUER NO CONTROL VENT

## (undated) DEVICE — PACK FEM/POP

## (undated) DEVICE — SOL IRR BAG NS 0.9% 1000ML

## (undated) DEVICE — SUT PROLENE 6-0 4-30" BV-1

## (undated) DEVICE — INFLATION DEVICE BASIXCOMPAK

## (undated) DEVICE — SPECIMEN CONTAINER 100ML

## (undated) DEVICE — DRAPE 3/4 SHEET W REINFORCEMENT 56X77"

## (undated) DEVICE — DRAPE FEMORAL ANGIOGRAPHY W TROUGH

## (undated) DEVICE — SUT PROLENE 6-0 4-30" C-1

## (undated) DEVICE — CONN DUAL HOSE

## (undated) DEVICE — DRSG TEGADERM 6 X 8"

## (undated) DEVICE — SUT POLYSORB 2-0 30" GS-21 UNDYED

## (undated) DEVICE — TUBING HIGH POWER CONTRAST INJ

## (undated) DEVICE — SUT SOFSILK 0 18" TIES

## (undated) DEVICE — SOL IRR POUR NS 0.9% 500ML

## (undated) DEVICE — VISITEC 4X4

## (undated) DEVICE — DRAPE INSTRUMENT POUCH 6.75" X 11"

## (undated) DEVICE — DRAPE MAGNETIC INSTRUMENT MEDIUM

## (undated) DEVICE — DRAPE MAYO STAND 30"

## (undated) DEVICE — DRAPE 1/2 SHEET 40X57"

## (undated) DEVICE — SUT SOFSILK 3-0 30" TIES

## (undated) DEVICE — VENODYNE/SCD SLEEVE CALF MEDIUM

## (undated) DEVICE — GLV 7 PROTEXIS (WHITE)

## (undated) DEVICE — PACK BASIN SPECIAL PROCEDURE

## (undated) DEVICE — MARKING PEN W RULER

## (undated) DEVICE — DRAIN RESERVOIR FOR JACKSON PRATT 100CC CARDINAL

## (undated) DEVICE — DRAPE IOBAN 23" X 23"

## (undated) DEVICE — ELCTR BOVIE PENCIL HANDPIECE

## (undated) DEVICE — LAP PAD 18 X 18"

## (undated) DEVICE — SUT PROLENE 7-0 24" BV175-6

## (undated) DEVICE — SYR TB 1CC 27G X 0.5 (GREY)

## (undated) DEVICE — SYR LUER LOK 10CC

## (undated) DEVICE — SUT SOFSILK 4-0 18" TIES

## (undated) DEVICE — SUT PROLENE 7-0 4-24" BV-1

## (undated) DEVICE — MEDICATION LABELS W MARKER

## (undated) DEVICE — DRSG STERISTRIPS 0.5 X 4"

## (undated) DEVICE — STAPLER SKIN VISI-STAT 35 WIDE

## (undated) DEVICE — PREP CHLORAPREP HI-LITE ORANGE 26ML

## (undated) DEVICE — TORQUE DEVICE FOR GUIDEWIRE 0.0100.038"

## (undated) DEVICE — NDL PERC BASEPLT 18GX7CM

## (undated) DEVICE — WARMING BLANKET UPPER ADULT

## (undated) DEVICE — SUT SOFSILK 2-0 18" TIES

## (undated) DEVICE — SYR LUER LOK 20CC

## (undated) DEVICE — PACK GENERAL MINOR

## (undated) DEVICE — DRAPE LIGHT HANDLE COVER (BLUE)

## (undated) DEVICE — SUT BIOSYN 4-0 18" P-12

## (undated) DEVICE — TAPE GLO-N-TELL RADIOPAQUE 20 STRIPS

## (undated) DEVICE — POSITIONER FOAM EGG CRATE ULNAR 2PCS (PINK)

## (undated) DEVICE — DRAPE EQUIPMENT BANDED BAG 30 X 30" (SHOWER CAP)

## (undated) DEVICE — FOLEY TRAY 16FR 5CC LTX UMETER CLOSED

## (undated) DEVICE — BLADE SCALPEL SAFETYLOCK #15

## (undated) DEVICE — DRAPE TOWEL BLUE 17" X 24"

## (undated) DEVICE — BLADE SCALPEL SAFETYLOCK #11

## (undated) DEVICE — SYR CONTRAST 10ML